# Patient Record
Sex: FEMALE | Race: WHITE | NOT HISPANIC OR LATINO | Employment: FULL TIME | URBAN - METROPOLITAN AREA
[De-identification: names, ages, dates, MRNs, and addresses within clinical notes are randomized per-mention and may not be internally consistent; named-entity substitution may affect disease eponyms.]

---

## 2017-01-18 ENCOUNTER — APPOINTMENT (OUTPATIENT)
Dept: WOMENS IMAGING | Facility: HOSPITAL | Age: 47
End: 2017-01-18

## 2017-01-18 PROCEDURE — 77067 SCR MAMMO BI INCL CAD: CPT | Performed by: RADIOLOGY

## 2017-02-17 ENCOUNTER — OFFICE VISIT (OUTPATIENT)
Dept: ONCOLOGY | Facility: CLINIC | Age: 47
End: 2017-02-17

## 2017-02-17 ENCOUNTER — LAB (OUTPATIENT)
Dept: LAB | Facility: HOSPITAL | Age: 47
End: 2017-02-17

## 2017-02-17 VITALS
HEART RATE: 66 BPM | OXYGEN SATURATION: 100 % | TEMPERATURE: 98.1 F | HEIGHT: 66 IN | RESPIRATION RATE: 16 BRPM | DIASTOLIC BLOOD PRESSURE: 70 MMHG | SYSTOLIC BLOOD PRESSURE: 118 MMHG | WEIGHT: 162.6 LBS | BODY MASS INDEX: 26.13 KG/M2

## 2017-02-17 DIAGNOSIS — D47.3 ESSENTIAL THROMBOCYTHEMIA (HCC): ICD-10-CM

## 2017-02-17 DIAGNOSIS — D47.3 ESSENTIAL THROMBOCYTHEMIA (HCC): Primary | ICD-10-CM

## 2017-02-17 LAB
BASOPHILS # BLD AUTO: 0.07 10*3/MM3 (ref 0–0.1)
BASOPHILS NFR BLD AUTO: 0.8 % (ref 0–1.1)
DEPRECATED RDW RBC AUTO: 35.5 FL (ref 37–49)
EOSINOPHIL # BLD AUTO: 0.48 10*3/MM3 (ref 0–0.36)
EOSINOPHIL NFR BLD AUTO: 5.4 % (ref 1–5)
ERYTHROCYTE [DISTWIDTH] IN BLOOD BY AUTOMATED COUNT: 17.1 % (ref 11.7–14.5)
HCT VFR BLD AUTO: 32.6 % (ref 34–45)
HGB BLD-MCNC: 10.5 G/DL (ref 11.5–14.9)
IMM GRANULOCYTES # BLD: 0.04 10*3/MM3 (ref 0–0.03)
IMM GRANULOCYTES NFR BLD: 0.5 % (ref 0–0.5)
LYMPHOCYTES # BLD AUTO: 1.92 10*3/MM3 (ref 1–3.5)
LYMPHOCYTES NFR BLD AUTO: 21.7 % (ref 20–49)
MCH RBC QN AUTO: 20.2 PG (ref 27–33)
MCHC RBC AUTO-ENTMCNC: 32.2 G/DL (ref 32–35)
MCV RBC AUTO: 62.7 FL (ref 83–97)
MONOCYTES # BLD AUTO: 0.59 10*3/MM3 (ref 0.25–0.8)
MONOCYTES NFR BLD AUTO: 6.7 % (ref 4–12)
NEUTROPHILS # BLD AUTO: 5.75 10*3/MM3 (ref 1.5–7)
NEUTROPHILS NFR BLD AUTO: 64.9 % (ref 39–75)
NRBC BLD MANUAL-RTO: 0 /100 WBC (ref 0–0)
PLATELET # BLD AUTO: 1262 10*3/MM3 (ref 150–375)
PMV BLD AUTO: 9.9 FL (ref 8.9–12.1)
RBC # BLD AUTO: 5.2 10*6/MM3 (ref 3.9–5)
WBC NRBC COR # BLD: 8.85 10*3/MM3 (ref 4–10)

## 2017-02-17 PROCEDURE — 99213 OFFICE O/P EST LOW 20 MIN: CPT | Performed by: INTERNAL MEDICINE

## 2017-02-17 PROCEDURE — 85025 COMPLETE CBC W/AUTO DIFF WBC: CPT | Performed by: INTERNAL MEDICINE

## 2017-02-17 PROCEDURE — 36416 COLLJ CAPILLARY BLOOD SPEC: CPT | Performed by: INTERNAL MEDICINE

## 2017-02-17 RX ORDER — FLUNISOLIDE 0.25 MG/ML
SOLUTION NASAL
COMMUNITY
Start: 2017-02-06 | End: 2017-06-23

## 2017-02-17 RX ORDER — AZELASTINE 1 MG/ML
SPRAY, METERED NASAL
COMMUNITY
Start: 2017-02-06 | End: 2017-06-23

## 2017-02-17 NOTE — PROGRESS NOTES
"[facility] GROUP OUTPATIENT FOLLOW UP CLINIC VISIT    REASON FOR FOLLOW-UP:    1.  Beta thalassemia minor.  2.  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    HISTORY OF PRESENT ILLNESS:  Mar Hill is a 46 y.o. female who returns today for follow up of the above issue.  She is doing well and denies any new problems today.  No bleeding.  No headaches.  No visual changes.  She continues aspirin 81 mg daily.    PAST MEDICAL, SURGICAL, FAMILY, AND SOCIAL HISTORIES were reviewed with the patient and in the electronic medical record, and were updated if indicated.    ALLERGIES:  No Known Allergies    MEDICATIONS:  The medication list has been reviewed with the patient by the medical assistant, and the list has been updated in the electronic medical record, which I reviewed.  Medication dosages and frequencies were confirmed to be accurate.    REVIEW OF SYSTEMS:  PAIN:  See Vital Signs below.  GENERAL:  No fevers, chills, night sweats, or unintended weight loss.  SKIN:  No rashes or non-healing lesions.  HEME/LYMPH:  No abnormal bleeding.  No palpable lymphadenopathy.  EYES:  No vision changes or diplopia.  ENT:  See history of present illness  RESPIRATORY:  No cough, shortness of breath, hemoptysis, or wheezing.  CARDIOVASCULAR:  No chest pain, palpitations, orthopnea, or dyspnea on exertion.  GASTROINTESTINAL:  No abdominal pain, nausea, vomiting, constipation, diarrhea, melena, or hematochezia.  GENITOURINARY:  No dysuria or hematuria.  MUSCULOSKELETAL:  No muscle or joint pain.  NEUROLOGIC:  No dizziness, loss of consciousness, or seizures.  PSYCHIATRIC:  No depression, anxiety, or mood changes.    Vitals:    02/17/17 1241   BP: 118/70   Pulse: 66   Resp: 16   Temp: 98.1 °F (36.7 °C)   TempSrc: Oral   SpO2: 100%   Weight: 162 lb 9.6 oz (73.8 kg)   Height: 66.34\" (168.5 cm)   PainSc: 0-No pain       PHYSICAL EXAMINATION:  GENERAL:  Well-developed well-nourished female; awake, alert and " oriented, in no acute distress.  SKIN:  Warm and dry, without rashes, purpura, or petechiae.  HEAD:  Normocephalic, atraumatic.  EYES:  Pupils equal, round and reactive to light.  Extraocular movements intact.  Conjunctivae normal.  EARS:  Hearing intact.  NOSE:  Septum midline.  No excoriations or nasal discharge.  MOUTH:  No stomatitis or ulcers.  Lips are normal.  THROAT:  Oropharynx without lesions or exudates.  NECK:  Supple with good range of motion; no thyromegaly or masses; no JVD or bruits.  LYMPHATICS:  No cervical, supraclavicular, axillary, or inguinal lymphadenopathy.  CHEST:  Lungs are clear to auscultation bilaterally.  No wheezes, rales, or rhonchi.  HEART:  Regular rate; normal rhythm.  No murmurs, gallops or rubs.  ABDOMEN:  Soft, non-tender, non-distended.  Normal active bowel sounds.  No organomegaly.  EXTREMITIES:  No clubbing, cyanosis, or edema.  NEUROLOGICAL:  No focal neurologic deficits.    DIAGNOSTIC DATA:  Lab Results   Component Value Date    WBC 8.85 02/17/2017    HGB 10.5 (L) 02/17/2017    HCT 32.6 (L) 02/17/2017    MCV 62.7 (L) 02/17/2017    PLT 1262 (C) 02/17/2017     Genetic testing was positive for a calreticulin mutation.  EMANUEL 2 and MPL mutation analyses were negative.  Von Willebrand's antigen and activity levels were normal.    ASSESSMENT:  This is a 46 y.o. female with:  1.  Essential thrombocythemia with a calreticulin mutation.  Her platelet count is rising a little bit, but I see no reason for treatment at this time as she is at low risk for thromboembolic complications. She will continue aspirin 81 mg daily.    2.  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    PLAN:  1.  Continue aspirin 81 mg daily.  2.  Return in 4 months for follow-up.

## 2017-06-23 ENCOUNTER — LAB (OUTPATIENT)
Dept: LAB | Facility: HOSPITAL | Age: 47
End: 2017-06-23

## 2017-06-23 ENCOUNTER — OFFICE VISIT (OUTPATIENT)
Dept: ONCOLOGY | Facility: CLINIC | Age: 47
End: 2017-06-23

## 2017-06-23 VITALS
RESPIRATION RATE: 14 BRPM | TEMPERATURE: 98.2 F | DIASTOLIC BLOOD PRESSURE: 80 MMHG | WEIGHT: 164.8 LBS | OXYGEN SATURATION: 100 % | HEART RATE: 64 BPM | BODY MASS INDEX: 26.48 KG/M2 | SYSTOLIC BLOOD PRESSURE: 110 MMHG | HEIGHT: 66 IN

## 2017-06-23 DIAGNOSIS — D56.3 BETA THALASSEMIA MINOR: ICD-10-CM

## 2017-06-23 DIAGNOSIS — D47.3 ESSENTIAL THROMBOCYTHEMIA (HCC): ICD-10-CM

## 2017-06-23 DIAGNOSIS — D47.3 ESSENTIAL THROMBOCYTHEMIA (HCC): Primary | ICD-10-CM

## 2017-06-23 LAB
BASOPHILS # BLD AUTO: 0.07 10*3/MM3 (ref 0–0.1)
BASOPHILS NFR BLD AUTO: 0.6 % (ref 0–1.1)
DEPRECATED RDW RBC AUTO: 36 FL (ref 37–49)
EOSINOPHIL # BLD AUTO: 0.41 10*3/MM3 (ref 0–0.36)
EOSINOPHIL NFR BLD AUTO: 3.4 % (ref 1–5)
ERYTHROCYTE [DISTWIDTH] IN BLOOD BY AUTOMATED COUNT: 17.1 % (ref 11.7–14.5)
HCT VFR BLD AUTO: 33.9 % (ref 34–45)
HGB BLD-MCNC: 10.7 G/DL (ref 11.5–14.9)
IMM GRANULOCYTES # BLD: 0.09 10*3/MM3 (ref 0–0.03)
IMM GRANULOCYTES NFR BLD: 0.7 % (ref 0–0.5)
LYMPHOCYTES # BLD AUTO: 2.09 10*3/MM3 (ref 1–3.5)
LYMPHOCYTES NFR BLD AUTO: 17.3 % (ref 20–49)
MCH RBC QN AUTO: 20.2 PG (ref 27–33)
MCHC RBC AUTO-ENTMCNC: 31.6 G/DL (ref 32–35)
MCV RBC AUTO: 63.8 FL (ref 83–97)
MONOCYTES # BLD AUTO: 0.81 10*3/MM3 (ref 0.25–0.8)
MONOCYTES NFR BLD AUTO: 6.7 % (ref 4–12)
NEUTROPHILS # BLD AUTO: 8.62 10*3/MM3 (ref 1.5–7)
NEUTROPHILS NFR BLD AUTO: 71.3 % (ref 39–75)
NRBC BLD MANUAL-RTO: 0 /100 WBC (ref 0–0)
PLATELET # BLD AUTO: 1168 10*3/MM3 (ref 150–375)
PMV BLD AUTO: 9.8 FL (ref 8.9–12.1)
RBC # BLD AUTO: 5.31 10*6/MM3 (ref 3.9–5)
WBC NRBC COR # BLD: 12.09 10*3/MM3 (ref 4–10)

## 2017-06-23 PROCEDURE — 36416 COLLJ CAPILLARY BLOOD SPEC: CPT | Performed by: INTERNAL MEDICINE

## 2017-06-23 PROCEDURE — 85025 COMPLETE CBC W/AUTO DIFF WBC: CPT | Performed by: INTERNAL MEDICINE

## 2017-06-23 PROCEDURE — 99213 OFFICE O/P EST LOW 20 MIN: CPT | Performed by: INTERNAL MEDICINE

## 2017-06-23 NOTE — PROGRESS NOTES
[facility] GROUP OUTPATIENT FOLLOW UP CLINIC VISIT    REASON FOR FOLLOW-UP:    1.  Beta thalassemia minor.  2.  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    HISTORY OF PRESENT ILLNESS:  Mar Hill is a 47 y.o. female who returns today for follow up of the above issue.  She continues aspirin 81 mg daily.  She tolerates this well.  She continues to have some mild fatigue.  Otherwise she is doing very well.    Of note, her father was recently diagnosed with adenocarcinoma of unknown primary with a tumor mass near the right axilla or right flank.  This was resected and he is going to have chemotherapy and radiation apparently.    PAST MEDICAL, SURGICAL, FAMILY, AND SOCIAL HISTORIES were reviewed with the patient and in the electronic medical record, and were updated if indicated.    ALLERGIES:  No Known Allergies    MEDICATIONS:  The medication list has been reviewed with the patient by the medical assistant, and the list has been updated in the electronic medical record, which I reviewed.  Medication dosages and frequencies were confirmed to be accurate.    REVIEW OF SYSTEMS:  PAIN:  See Vital Signs below.  GENERAL:  No fevers, chills, night sweats, or unintended weight loss.  SKIN:  No rashes or non-healing lesions.  HEME/LYMPH:  No abnormal bleeding.  No palpable lymphadenopathy.  EYES:  No vision changes or diplopia.  ENT:  See history of present illness  RESPIRATORY:  No cough, shortness of breath, hemoptysis, or wheezing.  CARDIOVASCULAR:  No chest pain, palpitations, orthopnea, or dyspnea on exertion.  GASTROINTESTINAL:  No abdominal pain, nausea, vomiting, constipation, diarrhea, melena, or hematochezia.  GENITOURINARY:  No dysuria or hematuria.  MUSCULOSKELETAL:  No muscle or joint pain.  NEUROLOGIC:  No dizziness, loss of consciousness, or seizures.  PSYCHIATRIC:  No depression, anxiety, or mood changes.    Vitals:    06/23/17 1309   BP: 110/80   Pulse: 64   Resp: 14   Temp: 98.2  "°F (36.8 °C)   SpO2: 100%   Weight: 164 lb 12.8 oz (74.8 kg)   Height: 66.34\" (168.5 cm)   PainSc: 0-No pain       PHYSICAL EXAMINATION:  GENERAL:  Well-developed well-nourished female; awake, alert and oriented, in no acute distress.  SKIN:  Warm and dry, without rashes, purpura, or petechiae.  HEAD:  Normocephalic, atraumatic.  EYES:  Pupils equal, round and reactive to light.  Extraocular movements intact.  Conjunctivae normal.  EARS:  Hearing intact.  NOSE:  Septum midline.  No excoriations or nasal discharge.  MOUTH:  No stomatitis or ulcers.  Lips are normal.  THROAT:  Oropharynx without lesions or exudates.  NECK:  Supple with good range of motion; no thyromegaly or masses; no JVD or bruits.  LYMPHATICS:  No cervical, supraclavicular, axillary, or inguinal lymphadenopathy.  CHEST:  Lungs are clear to auscultation bilaterally.  No wheezes, rales, or rhonchi.  HEART:  Regular rate; normal rhythm.  No murmurs, gallops or rubs.  ABDOMEN:  Soft, non-tender, non-distended.  Normal active bowel sounds.  No organomegaly.  EXTREMITIES:  No clubbing, cyanosis, or edema.  NEUROLOGICAL:  No focal neurologic deficits.    DIAGNOSTIC DATA:  Lab Results   Component Value Date    WBC 12.09 (H) 06/23/2017    HGB 10.7 (L) 06/23/2017    HCT 33.9 (L) 06/23/2017    MCV 63.8 (L) 06/23/2017    PLT 1168 (C) 06/23/2017     Genetic testing was positive for a calreticulin mutation.  EMANUEL 2 and MPL mutation analyses were negative.    Von Willebrand's antigen and activity levels were normal.    ASSESSMENT:  This is a 47 y.o. female with:  1.  Essential thrombocythemia with a calreticulin mutation.  Her platelet count is quite elevated but stable at this point.  I believe that she is at low risk for thromboembolic complications.  She will continue aspirin 81 mg daily.  No other therapy at this time.    2.  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    PLAN:  1.  Continue aspirin 81 mg daily.  2.  Return in four months for " follow-up.

## 2017-10-02 ENCOUNTER — LAB (OUTPATIENT)
Dept: LAB | Facility: HOSPITAL | Age: 47
End: 2017-10-02

## 2017-10-02 ENCOUNTER — OFFICE VISIT (OUTPATIENT)
Dept: ONCOLOGY | Facility: CLINIC | Age: 47
End: 2017-10-02

## 2017-10-02 VITALS
HEART RATE: 71 BPM | WEIGHT: 165.4 LBS | HEIGHT: 66 IN | SYSTOLIC BLOOD PRESSURE: 128 MMHG | BODY MASS INDEX: 26.58 KG/M2 | RESPIRATION RATE: 16 BRPM | OXYGEN SATURATION: 99 % | DIASTOLIC BLOOD PRESSURE: 82 MMHG | TEMPERATURE: 98.1 F

## 2017-10-02 DIAGNOSIS — D47.3 ESSENTIAL THROMBOCYTHEMIA (HCC): ICD-10-CM

## 2017-10-02 DIAGNOSIS — D56.3 BETA THALASSEMIA MINOR: ICD-10-CM

## 2017-10-02 DIAGNOSIS — D47.3 ESSENTIAL THROMBOCYTHEMIA (HCC): Primary | ICD-10-CM

## 2017-10-02 LAB
BASOPHILS # BLD AUTO: 0.11 10*3/MM3 (ref 0–0.1)
BASOPHILS NFR BLD AUTO: 1.1 % (ref 0–1.1)
DEPRECATED RDW RBC AUTO: 33.8 FL (ref 37–49)
EOSINOPHIL # BLD AUTO: 0.26 10*3/MM3 (ref 0–0.36)
EOSINOPHIL NFR BLD AUTO: 2.6 % (ref 1–5)
ERYTHROCYTE [DISTWIDTH] IN BLOOD BY AUTOMATED COUNT: 16.1 % (ref 11.7–14.5)
HCT VFR BLD AUTO: 33.4 % (ref 34–45)
HGB BLD-MCNC: 10.8 G/DL (ref 11.5–14.9)
IMM GRANULOCYTES # BLD: 0.06 10*3/MM3 (ref 0–0.03)
IMM GRANULOCYTES NFR BLD: 0.6 % (ref 0–0.5)
LYMPHOCYTES # BLD AUTO: 2.02 10*3/MM3 (ref 1–3.5)
LYMPHOCYTES NFR BLD AUTO: 20.3 % (ref 20–49)
MCH RBC QN AUTO: 20.1 PG (ref 27–33)
MCHC RBC AUTO-ENTMCNC: 32.3 G/DL (ref 32–35)
MCV RBC AUTO: 62.2 FL (ref 83–97)
MONOCYTES # BLD AUTO: 0.59 10*3/MM3 (ref 0.25–0.8)
MONOCYTES NFR BLD AUTO: 5.9 % (ref 4–12)
NEUTROPHILS # BLD AUTO: 6.92 10*3/MM3 (ref 1.5–7)
NEUTROPHILS NFR BLD AUTO: 69.5 % (ref 39–75)
NRBC BLD MANUAL-RTO: 0 /100 WBC (ref 0–0)
PLATELET # BLD AUTO: 1322 10*3/MM3 (ref 150–375)
PMV BLD AUTO: 9.9 FL (ref 8.9–12.1)
RBC # BLD AUTO: 5.37 10*6/MM3 (ref 3.9–5)
WBC NRBC COR # BLD: 9.96 10*3/MM3 (ref 4–10)

## 2017-10-02 PROCEDURE — 85025 COMPLETE CBC W/AUTO DIFF WBC: CPT | Performed by: INTERNAL MEDICINE

## 2017-10-02 PROCEDURE — 99213 OFFICE O/P EST LOW 20 MIN: CPT | Performed by: INTERNAL MEDICINE

## 2017-10-02 PROCEDURE — 36415 COLL VENOUS BLD VENIPUNCTURE: CPT | Performed by: INTERNAL MEDICINE

## 2017-10-02 RX ORDER — RANITIDINE 150 MG/1
TABLET ORAL
COMMUNITY
Start: 2017-09-05 | End: 2020-07-07 | Stop reason: SDDI

## 2017-10-02 NOTE — PROGRESS NOTES
"[facility] GROUP OUTPATIENT FOLLOW UP CLINIC VISIT    REASON FOR FOLLOW-UP:    1.  Beta thalassemia minor.  2.  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    HISTORY OF PRESENT ILLNESS:  Mar Hlil is a 47 y.o. female who returns today for follow up of the above issue.  She continues aspirin 81 mg daily.  She is doing well.  No bleeding.      PAST MEDICAL, SURGICAL, FAMILY, AND SOCIAL HISTORIES were reviewed with the patient and in the electronic medical record, and were updated if indicated.    ALLERGIES:  No Known Allergies    MEDICATIONS:  The medication list has been reviewed with the patient by the medical assistant, and the list has been updated in the electronic medical record, which I reviewed.  Medication dosages and frequencies were confirmed to be accurate.    REVIEW OF SYSTEMS:  PAIN:  See Vital Signs below.  GENERAL:  No fevers, chills, night sweats, or unintended weight loss.  SKIN:  No rashes or non-healing lesions.  HEME/LYMPH:  No abnormal bleeding.  No palpable lymphadenopathy.  EYES:  No vision changes or diplopia.  ENT:  See history of present illness  RESPIRATORY:  No cough, shortness of breath, hemoptysis, or wheezing.  CARDIOVASCULAR:  No chest pain, palpitations, orthopnea, or dyspnea on exertion.  GASTROINTESTINAL:  No abdominal pain, nausea, vomiting, constipation, diarrhea, melena, or hematochezia.  GENITOURINARY:  No dysuria or hematuria.  MUSCULOSKELETAL:  No muscle or joint pain.  NEUROLOGIC:  No dizziness, loss of consciousness, or seizures.  PSYCHIATRIC:  No depression, anxiety, or mood changes.    Vitals:    10/02/17 1138   BP: 128/82   Pulse: 71   Resp: 16   Temp: 98.1 °F (36.7 °C)   TempSrc: Oral   SpO2: 99%   Weight: 165 lb 6.4 oz (75 kg)   Height: 66.02\" (167.7 cm)  Comment: new ht w/shoes   PainSc: 0-No pain       PHYSICAL EXAMINATION:  GENERAL:  Well-developed well-nourished female; awake, alert and oriented, in no acute distress.  SKIN:  Warm and " dry, without rashes, purpura, or petechiae.  HEAD:  Normocephalic, atraumatic.  EYES:  Pupils equal, round and reactive to light.  Extraocular movements intact.  Conjunctivae normal.  EARS:  Hearing intact.  NOSE:  Septum midline.  No excoriations or nasal discharge.  MOUTH:  No stomatitis or ulcers.  Lips are normal.  THROAT:  Oropharynx without lesions or exudates.  NECK:  Supple with good range of motion; no thyromegaly or masses; no JVD or bruits.  LYMPHATICS:  No cervical, supraclavicular, axillary, or inguinal lymphadenopathy.  CHEST:  Lungs are clear to auscultation bilaterally.  No wheezes, rales, or rhonchi.  HEART:  Regular rate; normal rhythm.  No murmurs, gallops or rubs.  ABDOMEN:  Soft, non-tender, non-distended.  Normal active bowel sounds.  I am unable to palpate the spleen today.    EXTREMITIES:  No clubbing, cyanosis, or edema.  NEUROLOGICAL:  No focal neurologic deficits.    DIAGNOSTIC DATA:  Lab Results   Component Value Date    WBC 9.96 10/02/2017    HGB 10.8 (L) 10/02/2017    HCT 33.4 (L) 10/02/2017    MCV 62.2 (L) 10/02/2017    PLT 1322 (C) 10/02/2017     Genetic testing was positive for a calreticulin mutation.  EMANUEL 2 and MPL mutation analyses were negative.    Von Willebrand's antigen and activity levels were normal on 5/6/2016.    ASSESSMENT:  This is a 47 y.o. female with:  1.  Essential thrombocythemia with a calreticulin mutation.  Her platelet count is elevating a bit more, but staying below 1.5 million.  I believe that she is at low risk for thromboembolic complications.  She will continue aspirin 81 mg daily.  No other therapy at this time.    2.  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    PLAN:  1.  Continue aspirin 81 mg daily.  2.  Return in 3-4 months for follow-up.  3.  If her platelet count reaches 1.5 million we will repeat von Willebrand's studies

## 2017-10-08 ENCOUNTER — DOCUMENTATION (OUTPATIENT)
Dept: ONCOLOGY | Facility: CLINIC | Age: 47
End: 2017-10-08

## 2017-10-13 ENCOUNTER — TELEPHONE (OUTPATIENT)
Dept: ONCOLOGY | Facility: HOSPITAL | Age: 47
End: 2017-10-13

## 2017-10-13 NOTE — TELEPHONE ENCOUNTER
Patient states she called on call MD Sunday about nose bleed down back of throat for a few hours. See  note from this. Pt calling today bc she realized  told her to reduce to a baby asa but that is the dose she has been on this whole time. Didn't know if she should resume on it daily or back off to every other day. [facility] sent to  for his recommendation.

## 2017-10-24 ENCOUNTER — TELEPHONE (OUTPATIENT)
Dept: ONCOLOGY | Facility: HOSPITAL | Age: 47
End: 2017-10-24

## 2017-10-24 NOTE — TELEPHONE ENCOUNTER
----- Message from Bekah Forrest RN sent at 10/24/2017  1:53 PM EDT -----      ----- Message -----     From: Segundo Robledo MD     Sent: 10/24/2017  12:07 PM       To: Bekah Forrest RN    Daily please.    ----- Message -----     From: Bekah Forrest RN     Sent: 10/13/2017   1:51 PM       To: Segundo Robledo MD    Patient had nose bleed down her throat on Sunday for a few hours. Called on call MD, was told to hold aspirin and resume with baby aspirin in 5 days. However she has been on baby ASA this whole time. Wasn't sure if she should go back on it daily or cut it back to every other day--wanted your input.

## 2017-10-25 ENCOUNTER — APPOINTMENT (OUTPATIENT)
Dept: WOMENS IMAGING | Facility: HOSPITAL | Age: 47
End: 2017-10-25

## 2018-01-08 ENCOUNTER — APPOINTMENT (OUTPATIENT)
Dept: ONCOLOGY | Facility: CLINIC | Age: 48
End: 2018-01-08

## 2018-01-08 ENCOUNTER — APPOINTMENT (OUTPATIENT)
Dept: LAB | Facility: HOSPITAL | Age: 48
End: 2018-01-08

## 2018-01-23 ENCOUNTER — APPOINTMENT (OUTPATIENT)
Dept: WOMENS IMAGING | Facility: HOSPITAL | Age: 48
End: 2018-01-23

## 2018-01-23 PROCEDURE — 77067 SCR MAMMO BI INCL CAD: CPT | Performed by: RADIOLOGY

## 2018-01-23 PROCEDURE — 77063 BREAST TOMOSYNTHESIS BI: CPT | Performed by: RADIOLOGY

## 2018-01-29 ENCOUNTER — LAB (OUTPATIENT)
Dept: LAB | Facility: HOSPITAL | Age: 48
End: 2018-01-29

## 2018-01-29 ENCOUNTER — OFFICE VISIT (OUTPATIENT)
Dept: ONCOLOGY | Facility: CLINIC | Age: 48
End: 2018-01-29

## 2018-01-29 VITALS
WEIGHT: 156 LBS | OXYGEN SATURATION: 99 % | SYSTOLIC BLOOD PRESSURE: 122 MMHG | DIASTOLIC BLOOD PRESSURE: 74 MMHG | TEMPERATURE: 98.3 F | RESPIRATION RATE: 12 BRPM | BODY MASS INDEX: 25.99 KG/M2 | HEIGHT: 65 IN | HEART RATE: 68 BPM

## 2018-01-29 DIAGNOSIS — D47.3 ESSENTIAL THROMBOCYTHEMIA (HCC): ICD-10-CM

## 2018-01-29 DIAGNOSIS — D47.3 ESSENTIAL THROMBOCYTHEMIA (HCC): Primary | ICD-10-CM

## 2018-01-29 DIAGNOSIS — D56.3 BETA THALASSEMIA MINOR: ICD-10-CM

## 2018-01-29 LAB
BASOPHILS # BLD AUTO: 0.08 10*3/MM3 (ref 0–0.1)
BASOPHILS NFR BLD AUTO: 0.8 % (ref 0–1.1)
DEPRECATED RDW RBC AUTO: 34 FL (ref 37–49)
EOSINOPHIL # BLD AUTO: 0.24 10*3/MM3 (ref 0–0.36)
EOSINOPHIL NFR BLD AUTO: 2.3 % (ref 1–5)
ERYTHROCYTE [DISTWIDTH] IN BLOOD BY AUTOMATED COUNT: 15.9 % (ref 11.7–14.5)
HCT VFR BLD AUTO: 33.2 % (ref 34–45)
HGB BLD-MCNC: 10.5 G/DL (ref 11.5–14.9)
IMM GRANULOCYTES # BLD: 0.08 10*3/MM3 (ref 0–0.03)
IMM GRANULOCYTES NFR BLD: 0.8 % (ref 0–0.5)
LYMPHOCYTES # BLD AUTO: 1.99 10*3/MM3 (ref 1–3.5)
LYMPHOCYTES NFR BLD AUTO: 19 % (ref 20–49)
MCH RBC QN AUTO: 19.8 PG (ref 27–33)
MCHC RBC AUTO-ENTMCNC: 31.6 G/DL (ref 32–35)
MCV RBC AUTO: 62.6 FL (ref 83–97)
MONOCYTES # BLD AUTO: 0.69 10*3/MM3 (ref 0.25–0.8)
MONOCYTES NFR BLD AUTO: 6.6 % (ref 4–12)
NEUTROPHILS # BLD AUTO: 7.38 10*3/MM3 (ref 1.5–7)
NEUTROPHILS NFR BLD AUTO: 70.5 % (ref 39–75)
NRBC BLD MANUAL-RTO: 0 /100 WBC (ref 0–0)
PLATELET # BLD AUTO: 1276 10*3/MM3 (ref 150–375)
PMV BLD AUTO: 10.1 FL (ref 8.9–12.1)
RBC # BLD AUTO: 5.3 10*6/MM3 (ref 3.9–5)
WBC NRBC COR # BLD: 10.46 10*3/MM3 (ref 4–10)

## 2018-01-29 PROCEDURE — 85025 COMPLETE CBC W/AUTO DIFF WBC: CPT | Performed by: INTERNAL MEDICINE

## 2018-01-29 PROCEDURE — 99213 OFFICE O/P EST LOW 20 MIN: CPT | Performed by: INTERNAL MEDICINE

## 2018-01-29 PROCEDURE — 36415 COLL VENOUS BLD VENIPUNCTURE: CPT | Performed by: INTERNAL MEDICINE

## 2018-01-29 RX ORDER — ALBUTEROL SULFATE 90 UG/1
AEROSOL, METERED RESPIRATORY (INHALATION) AS NEEDED
COMMUNITY
Start: 2018-01-10 | End: 2020-11-11 | Stop reason: SDDI

## 2018-01-29 RX ORDER — OSELTAMIVIR PHOSPHATE 75 MG/1
CAPSULE ORAL
Refills: 0 | COMMUNITY
Start: 2018-01-08 | End: 2018-01-29

## 2018-01-29 RX ORDER — FLUCONAZOLE 150 MG/1
TABLET ORAL
COMMUNITY
Start: 2017-12-15 | End: 2018-01-29

## 2018-01-29 NOTE — PROGRESS NOTES
[facility] GROUP OUTPATIENT FOLLOW UP CLINIC VISIT    REASON FOR FOLLOW-UP:    1.  Beta thalassemia minor.  2.  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    HISTORY OF PRESENT ILLNESS:  Mar Hill is a 47 y.o. female who returns today for follow up of the above issue.  She continues aspirin 81 mg daily.  She is doing well.  She did call with significant epistaxis since she was last seen in the office.  This was draining down the back of her throat.  It resolved quickly and she has not had any further issues with bleeding.    PAST MEDICAL, SURGICAL, FAMILY, AND SOCIAL HISTORIES were reviewed with the patient and in the electronic medical record, and were updated if indicated.    ALLERGIES:  No Known Allergies    MEDICATIONS:  The medication list has been reviewed with the patient by the medical assistant, and the list has been updated in the electronic medical record, which I reviewed.  Medication dosages and frequencies were confirmed to be accurate.    REVIEW OF SYSTEMS:  PAIN:  See Vital Signs below.  GENERAL:  No fevers, chills, night sweats, or unintended weight loss.  SKIN:  No rashes or non-healing lesions.  HEME/LYMPH:  No abnormal bleeding.  No palpable lymphadenopathy.  EYES:  No vision changes or diplopia.  ENT:  See history of present illness  RESPIRATORY:  No cough, shortness of breath, hemoptysis, or wheezing.  CARDIOVASCULAR:  No chest pain, palpitations, orthopnea, or dyspnea on exertion.  GASTROINTESTINAL:  No abdominal pain, nausea, vomiting, constipation, diarrhea, melena, or hematochezia.  GENITOURINARY:  No dysuria or hematuria.  MUSCULOSKELETAL:  No muscle or joint pain.  NEUROLOGIC:  No dizziness, loss of consciousness, or seizures.  PSYCHIATRIC:  No depression, anxiety, or mood changes.    Vitals:    01/29/18 1256   BP: 122/74   Pulse: 68   Resp: 12   Temp: 98.3 °F (36.8 °C)   TempSrc: Oral   SpO2: 99%   Weight: 70.8 kg (156 lb)   Height: 165.6 cm  "(65.2\")  Comment: new height   PainSc: 0-No pain       PHYSICAL EXAMINATION:  GENERAL:  Well-developed well-nourished female; awake, alert and oriented, in no acute distress.  SKIN:  Warm and dry, without rashes, purpura, or petechiae.  HEAD:  Normocephalic, atraumatic.  EYES:  Pupils equal, round and reactive to light.  Extraocular movements intact.  Conjunctivae normal.  EARS:  Hearing intact.  NOSE:  Septum midline.  No excoriations or nasal discharge.  MOUTH:  No stomatitis or ulcers.  Lips are normal.  THROAT:  Oropharynx without lesions or exudates.  NECK:  Supple with good range of motion; no thyromegaly or masses; no JVD or bruits.  LYMPHATICS:  No cervical, supraclavicular, axillary, or inguinal lymphadenopathy.  CHEST:  Lungs are clear to auscultation bilaterally.  No wheezes, rales, or rhonchi.  HEART:  Regular rate; normal rhythm.  No murmurs, gallops or rubs.  ABDOMEN:  Soft, non-tender, non-distended.  Normal active bowel sounds.  I am unable to palpate the spleen today.    EXTREMITIES:  No clubbing, cyanosis, or edema.  NEUROLOGICAL:  No focal neurologic deficits.    DIAGNOSTIC DATA:  Lab Results   Component Value Date    WBC 10.46 (H) 01/29/2018    HGB 10.5 (L) 01/29/2018    HCT 33.2 (L) 01/29/2018    MCV 62.6 (L) 01/29/2018    PLT 1276 (C) 01/29/2018     Genetic testing was positive for a calreticulin mutation.  EMANUEL 2 and MPL mutation analyses were negative.    Von Willebrand's antigen and activity levels were normal on 5/6/2016.    ASSESSMENT:  This is a 47 y.o. female with:  1.  Essential thrombocythemia with a calreticulin mutation.  Her platelet count is stable and actually decreased from her last visit, but staying below 1.5 million.  I believe that she is at low risk for thromboembolic complications.  She will continue aspirin 81 mg daily.  No other therapy at this time.    2.  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    3.  Epistaxis which resolved without intervention other than " very briefly interrupting the aspirin therapy    PLAN:  1.  Continue aspirin 81 mg daily.  2.  Return in 3-4 months for follow-up with a CBC.  3.  If her platelet count reaches 1.5 million we will repeat von Willebrand's studies

## 2018-05-07 ENCOUNTER — OFFICE VISIT (OUTPATIENT)
Dept: ONCOLOGY | Facility: CLINIC | Age: 48
End: 2018-05-07

## 2018-05-07 ENCOUNTER — LAB (OUTPATIENT)
Dept: LAB | Facility: HOSPITAL | Age: 48
End: 2018-05-07

## 2018-05-07 VITALS
WEIGHT: 162.2 LBS | BODY MASS INDEX: 27.02 KG/M2 | HEIGHT: 65 IN | TEMPERATURE: 98.8 F | SYSTOLIC BLOOD PRESSURE: 100 MMHG | OXYGEN SATURATION: 100 % | HEART RATE: 71 BPM | DIASTOLIC BLOOD PRESSURE: 70 MMHG

## 2018-05-07 DIAGNOSIS — D47.3 ESSENTIAL THROMBOCYTHEMIA (HCC): ICD-10-CM

## 2018-05-07 DIAGNOSIS — D47.3 ESSENTIAL THROMBOCYTHEMIA (HCC): Primary | ICD-10-CM

## 2018-05-07 LAB
BASOPHILS # BLD AUTO: 0.08 10*3/MM3 (ref 0–0.1)
BASOPHILS NFR BLD AUTO: 0.8 % (ref 0–1.1)
DEPRECATED RDW RBC AUTO: 34.7 FL (ref 37–49)
EOSINOPHIL # BLD AUTO: 0.25 10*3/MM3 (ref 0–0.36)
EOSINOPHIL NFR BLD AUTO: 2.4 % (ref 1–5)
ERYTHROCYTE [DISTWIDTH] IN BLOOD BY AUTOMATED COUNT: 16.4 % (ref 11.7–14.5)
HCT VFR BLD AUTO: 33.8 % (ref 34–45)
HGB BLD-MCNC: 10.5 G/DL (ref 11.5–14.9)
IMM GRANULOCYTES # BLD: 0.06 10*3/MM3 (ref 0–0.03)
IMM GRANULOCYTES NFR BLD: 0.6 % (ref 0–0.5)
LYMPHOCYTES # BLD AUTO: 1.99 10*3/MM3 (ref 1–3.5)
LYMPHOCYTES NFR BLD AUTO: 18.8 % (ref 20–49)
MCH RBC QN AUTO: 19.6 PG (ref 27–33)
MCHC RBC AUTO-ENTMCNC: 31.1 G/DL (ref 32–35)
MCV RBC AUTO: 63.2 FL (ref 83–97)
MONOCYTES # BLD AUTO: 0.57 10*3/MM3 (ref 0.25–0.8)
MONOCYTES NFR BLD AUTO: 5.4 % (ref 4–12)
NEUTROPHILS # BLD AUTO: 7.61 10*3/MM3 (ref 1.5–7)
NEUTROPHILS NFR BLD AUTO: 72 % (ref 39–75)
NRBC BLD MANUAL-RTO: 0 /100 WBC (ref 0–0)
PLATELET # BLD AUTO: 1117 10*3/MM3 (ref 150–375)
PMV BLD AUTO: 10 FL (ref 8.9–12.1)
RBC # BLD AUTO: 5.35 10*6/MM3 (ref 3.9–5)
WBC NRBC COR # BLD: 10.56 10*3/MM3 (ref 4–10)

## 2018-05-07 PROCEDURE — 36415 COLL VENOUS BLD VENIPUNCTURE: CPT | Performed by: INTERNAL MEDICINE

## 2018-05-07 PROCEDURE — 85025 COMPLETE CBC W/AUTO DIFF WBC: CPT

## 2018-05-07 PROCEDURE — 99213 OFFICE O/P EST LOW 20 MIN: CPT | Performed by: INTERNAL MEDICINE

## 2018-05-07 NOTE — PROGRESS NOTES
"[facility] GROUP OUTPATIENT FOLLOW UP CLINIC VISIT    REASON FOR FOLLOW-UP:    1.  Beta thalassemia minor.  2.  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    HISTORY OF PRESENT ILLNESS:  Mar Hill is a 48 y.o. female who returns today for follow up of the above issue.  She continues aspirin 81 mg daily.  She continues to do well without any new issues today.  No bleeding.      PAST MEDICAL, SURGICAL, FAMILY, AND SOCIAL HISTORIES were reviewed with the patient and in the electronic medical record, and were updated if indicated.    ALLERGIES:  No Known Allergies    MEDICATIONS:  The medication list has been reviewed with the patient by the medical assistant, and the list has been updated in the electronic medical record, which I reviewed.  Medication dosages and frequencies were confirmed to be accurate.    REVIEW OF SYSTEMS:  PAIN:  See Vital Signs below.  GENERAL:  No fevers, chills, night sweats, or unintended weight loss.  SKIN:  No rashes or non-healing lesions.  HEME/LYMPH:  No abnormal bleeding.  No palpable lymphadenopathy.  EYES:  No vision changes or diplopia.  ENT:  No sore throat or difficulty swallowing  RESPIRATORY:  No cough, shortness of breath, hemoptysis, or wheezing.  CARDIOVASCULAR:  No chest pain, palpitations, orthopnea, or dyspnea on exertion.  GASTROINTESTINAL:  No abdominal pain, nausea, vomiting, constipation, diarrhea, melena, or hematochezia.  GENITOURINARY:  No dysuria or hematuria.  MUSCULOSKELETAL:  No muscle or joint pain.  NEUROLOGIC:  No dizziness, loss of consciousness, or seizures.  PSYCHIATRIC:  No depression, anxiety, or mood changes.    Vitals:    05/07/18 1315   BP: 100/70   Pulse: 71   Temp: 98.8 °F (37.1 °C)   SpO2: 100%  Comment: at rest   Weight: 73.6 kg (162 lb 3.2 oz)   Height: 166 cm (65.35\")  Comment: new ht w/o shoes   PainSc: 0-No pain       PHYSICAL EXAMINATION:  GENERAL:  Well-developed well-nourished female; awake, alert and oriented, " in no acute distress.  SKIN:  Warm and dry, without rashes, purpura, or petechiae.  HEAD:  Normocephalic, atraumatic.  EYES:  Pupils equal, round and reactive to light.  Extraocular movements intact.  Conjunctivae normal.  EARS:  Hearing intact.  NOSE:  Septum midline.  No excoriations or nasal discharge.  MOUTH:  No stomatitis or ulcers.  Lips are normal.  THROAT:  Oropharynx without lesions or exudates.  NECK:  Supple with good range of motion; no thyromegaly or masses; no JVD or bruits.  LYMPHATICS:  No cervical, supraclavicular, axillary, or inguinal lymphadenopathy.  CHEST:  Lungs are clear to auscultation bilaterally.  No wheezes, rales, or rhonchi.  HEART:  Regular rate; normal rhythm.  No murmurs, gallops or rubs.  ABDOMEN:  Not examined today  EXTREMITIES:  No clubbing, cyanosis, or edema.  NEUROLOGICAL:  No focal neurologic deficits.    DIAGNOSTIC DATA:  Results for orders placed or performed in visit on 05/07/18   CBC Auto Differential   Result Value Ref Range    WBC 10.56 (H) 4.00 - 10.00 10*3/mm3    RBC 5.35 (H) 3.90 - 5.00 10*6/mm3    Hemoglobin 10.5 (L) 11.5 - 14.9 g/dL    Hematocrit 33.8 (L) 34.0 - 45.0 %    MCV 63.2 (L) 83.0 - 97.0 fL    MCH 19.6 (L) 27.0 - 33.0 pg    MCHC 31.1 (L) 32.0 - 35.0 g/dL    RDW 16.4 (H) 11.7 - 14.5 %    RDW-SD 34.7 (L) 37.0 - 49.0 fl    MPV 10.0 8.9 - 12.1 fL    Platelets 1,117 (C) 150 - 375 10*3/mm3    Neutrophil % 72.0 39.0 - 75.0 %    Lymphocyte % 18.8 (L) 20.0 - 49.0 %    Monocyte % 5.4 4.0 - 12.0 %    Eosinophil % 2.4 1.0 - 5.0 %    Basophil % 0.8 0.0 - 1.1 %    Immature Grans % 0.6 (H) 0.0 - 0.5 %    Neutrophils, Absolute 7.61 (H) 1.50 - 7.00 10*3/mm3    Lymphocytes, Absolute 1.99 1.00 - 3.50 10*3/mm3    Monocytes, Absolute 0.57 0.25 - 0.80 10*3/mm3    Eosinophils, Absolute 0.25 0.00 - 0.36 10*3/mm3    Basophils, Absolute 0.08 0.00 - 0.10 10*3/mm3    Immature Grans, Absolute 0.06 (H) 0.00 - 0.03 10*3/mm3    nRBC 0.0 0.0 - 0.0 /100 WBC     Genetic testing was  positive for a calreticulin mutation.  EMANUEL 2 and MPL mutation analyses were negative.    Von Willebrand's antigen and activity levels were normal on 5/6/2016.    ASSESSMENT:  This is a 48 y.o. female with:  1.  Essential thrombocythemia with a calreticulin mutation.  Her platelet count is lower today.   I believe that she is at low risk for thromboembolic complications.  She will continue aspirin 81 mg daily.  No other therapy at this time.    2.  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  Epistaxis which resolved without intervention other than very briefly interrupting the aspirin therapy    PLAN:  1.  Continue aspirin 81 mg daily.  2.  Return in 4 months for follow-up with a CBC.  3.  If her platelet count reaches 1.5 million we will repeat von Willebrand's studies

## 2018-08-29 ENCOUNTER — LAB (OUTPATIENT)
Dept: LAB | Facility: HOSPITAL | Age: 48
End: 2018-08-29

## 2018-08-29 ENCOUNTER — OFFICE VISIT (OUTPATIENT)
Dept: ONCOLOGY | Facility: CLINIC | Age: 48
End: 2018-08-29

## 2018-08-29 VITALS
OXYGEN SATURATION: 97 % | DIASTOLIC BLOOD PRESSURE: 62 MMHG | TEMPERATURE: 98.3 F | SYSTOLIC BLOOD PRESSURE: 110 MMHG | BODY MASS INDEX: 26.33 KG/M2 | WEIGHT: 158 LBS | HEIGHT: 65 IN | RESPIRATION RATE: 18 BRPM | HEART RATE: 82 BPM

## 2018-08-29 DIAGNOSIS — D47.3 ESSENTIAL THROMBOCYTHEMIA (HCC): Primary | ICD-10-CM

## 2018-08-29 DIAGNOSIS — D47.3 ESSENTIAL THROMBOCYTHEMIA (HCC): ICD-10-CM

## 2018-08-29 LAB
BASOPHILS # BLD AUTO: 0.08 10*3/MM3 (ref 0–0.1)
BASOPHILS NFR BLD AUTO: 0.9 % (ref 0–1.1)
DEPRECATED RDW RBC AUTO: 35.7 FL (ref 37–49)
EOSINOPHIL # BLD AUTO: 0.3 10*3/MM3 (ref 0–0.36)
EOSINOPHIL NFR BLD AUTO: 3.2 % (ref 1–5)
ERYTHROCYTE [DISTWIDTH] IN BLOOD BY AUTOMATED COUNT: 16.6 % (ref 11.7–14.5)
HCT VFR BLD AUTO: 35.2 % (ref 34–45)
HGB BLD-MCNC: 10.9 G/DL (ref 11.5–14.9)
IMM GRANULOCYTES # BLD: 0.05 10*3/MM3 (ref 0–0.03)
IMM GRANULOCYTES NFR BLD: 0.5 % (ref 0–0.5)
LYMPHOCYTES # BLD AUTO: 1.97 10*3/MM3 (ref 1–3.5)
LYMPHOCYTES NFR BLD AUTO: 21.2 % (ref 20–49)
MCH RBC QN AUTO: 19.7 PG (ref 27–33)
MCHC RBC AUTO-ENTMCNC: 31 G/DL (ref 32–35)
MCV RBC AUTO: 63.7 FL (ref 83–97)
MONOCYTES # BLD AUTO: 0.6 10*3/MM3 (ref 0.25–0.8)
MONOCYTES NFR BLD AUTO: 6.5 % (ref 4–12)
NEUTROPHILS # BLD AUTO: 6.28 10*3/MM3 (ref 1.5–7)
NEUTROPHILS NFR BLD AUTO: 67.7 % (ref 39–75)
NRBC BLD MANUAL-RTO: 0 /100 WBC (ref 0–0)
PLATELET # BLD AUTO: 1277 10*3/MM3 (ref 150–375)
PMV BLD AUTO: 10.1 FL (ref 8.9–12.1)
RBC # BLD AUTO: 5.53 10*6/MM3 (ref 3.9–5)
WBC NRBC COR # BLD: 9.28 10*3/MM3 (ref 4–10)

## 2018-08-29 PROCEDURE — 99213 OFFICE O/P EST LOW 20 MIN: CPT | Performed by: INTERNAL MEDICINE

## 2018-08-29 PROCEDURE — 85025 COMPLETE CBC W/AUTO DIFF WBC: CPT | Performed by: INTERNAL MEDICINE

## 2018-08-29 PROCEDURE — 36415 COLL VENOUS BLD VENIPUNCTURE: CPT | Performed by: INTERNAL MEDICINE

## 2018-08-29 NOTE — PROGRESS NOTES
"[facility] GROUP OUTPATIENT FOLLOW UP CLINIC VISIT    REASON FOR FOLLOW-UP:    1.  Beta thalassemia minor.  2.  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    HISTORY OF PRESENT ILLNESS:  Mar Hill is a 48 y.o. female who returns today for follow up of the above issue.  She continues aspirin 81 mg daily.  She continues to do well without any new issues today.  No bleeding.  No change from prior visits.       PAST MEDICAL, SURGICAL, FAMILY, AND SOCIAL HISTORIES were reviewed with the patient and in the electronic medical record, and were updated if indicated.    ALLERGIES:  No Known Allergies    MEDICATIONS:  The medication list has been reviewed with the patient by the medical assistant, and the list has been updated in the electronic medical record, which I reviewed.  Medication dosages and frequencies were confirmed to be accurate.    REVIEW OF SYSTEMS:  PAIN:  See Vital Signs below.  GENERAL:  No fevers, chills, night sweats, or unintended weight loss.  SKIN:  No rashes or non-healing lesions.  HEME/LYMPH:  No abnormal bleeding.  No palpable lymphadenopathy.  EYES:  No vision changes or diplopia.  ENT:  No sore throat or difficulty swallowing  RESPIRATORY:  No cough, shortness of breath, hemoptysis, or wheezing.  CARDIOVASCULAR:  No chest pain, palpitations, orthopnea, or dyspnea on exertion.  GASTROINTESTINAL:  No abdominal pain, nausea, vomiting, constipation, diarrhea, melena, or hematochezia.  GENITOURINARY:  No dysuria or hematuria.  MUSCULOSKELETAL:  No muscle or joint pain.  NEUROLOGIC:  No dizziness, loss of consciousness, or seizures.  PSYCHIATRIC:  No depression, anxiety, or mood changes.  No change from prior    Vitals:    08/29/18 1209   BP: 110/62   Pulse: 82   Resp: 18   Temp: 98.3 °F (36.8 °C)   TempSrc: Oral   SpO2: 97%   Weight: 71.7 kg (158 lb)   Height: 166 cm (65.35\")  Comment: new height without shoes   PainSc: 0-No pain       PHYSICAL EXAMINATION:  GENERAL:  " Well-developed well-nourished female; awake, alert and oriented, in no acute distress.  SKIN:  Warm and dry, without rashes, purpura, or petechiae.  HEAD:  Normocephalic, atraumatic.  EYES:  Pupils equal, round and reactive to light.  Extraocular movements intact.  Conjunctivae normal.  EARS:  Hearing intact.  NOSE:  Septum midline.  No excoriations or nasal discharge.  MOUTH:  No stomatitis or ulcers.  Lips are normal.  THROAT:  Oropharynx without lesions or exudates.  NECK:  Supple with good range of motion; no thyromegaly or masses; no JVD or bruits.  LYMPHATICS:  No cervical, supraclavicular, axillary, or inguinal lymphadenopathy.  CHEST:  Lungs are clear to auscultation bilaterally.  No wheezes, rales, or rhonchi.  HEART:  Regular rate; normal rhythm.  No murmurs, gallops or rubs.  ABDOMEN:  Soft, NT, ND, NABS, spleen not palpable  EXTREMITIES:  No clubbing, cyanosis, or edema.  NEUROLOGICAL:  No focal neurologic deficits.    DIAGNOSTIC DATA:  Results for orders placed or performed in visit on 08/29/18   CBC Auto Differential   Result Value Ref Range    WBC 9.28 4.00 - 10.00 10*3/mm3    RBC 5.53 (H) 3.90 - 5.00 10*6/mm3    Hemoglobin 10.9 (L) 11.5 - 14.9 g/dL    Hematocrit 35.2 34.0 - 45.0 %    MCV 63.7 (L) 83.0 - 97.0 fL    MCH 19.7 (L) 27.0 - 33.0 pg    MCHC 31.0 (L) 32.0 - 35.0 g/dL    RDW 16.6 (H) 11.7 - 14.5 %    RDW-SD 35.7 (L) 37.0 - 49.0 fl    MPV 10.1 8.9 - 12.1 fL    Platelets 1,277 (C) 150 - 375 10*3/mm3    Neutrophil % 67.7 39.0 - 75.0 %    Lymphocyte % 21.2 20.0 - 49.0 %    Monocyte % 6.5 4.0 - 12.0 %    Eosinophil % 3.2 1.0 - 5.0 %    Basophil % 0.9 0.0 - 1.1 %    Immature Grans % 0.5 0.0 - 0.5 %    Neutrophils, Absolute 6.28 1.50 - 7.00 10*3/mm3    Lymphocytes, Absolute 1.97 1.00 - 3.50 10*3/mm3    Monocytes, Absolute 0.60 0.25 - 0.80 10*3/mm3    Eosinophils, Absolute 0.30 0.00 - 0.36 10*3/mm3    Basophils, Absolute 0.08 0.00 - 0.10 10*3/mm3    Immature Grans, Absolute 0.05 (H) 0.00 - 0.03  10*3/mm3    nRBC 0.0 0.0 - 0.0 /100 WBC     Genetic testing was positive for a calreticulin mutation.  EMANUEL 2 and MPL mutation analyses were negative.    Von Willebrand's antigen and activity levels were normal on 5/6/2016.    ASSESSMENT:  This is a 48 y.o. female with:  1.  Essential thrombocythemia with a calreticulin mutation.  Her platelet count is stable today.   I believe that she is at low risk for thromboembolic complications.  She will continue aspirin 81 mg daily.  No other therapy at this time.    2.  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  Epistaxis which resolved without intervention other than very briefly interrupting the aspirin therapy    PLAN:  1.  Continue aspirin 81 mg daily.  2.  Return in 4 months for follow-up with a CBC.  3.  If her platelet count reaches 1.5 million we will repeat von Willebrand's studies

## 2019-01-03 ENCOUNTER — OFFICE VISIT (OUTPATIENT)
Dept: ONCOLOGY | Facility: CLINIC | Age: 49
End: 2019-01-03

## 2019-01-03 ENCOUNTER — LAB (OUTPATIENT)
Dept: LAB | Facility: HOSPITAL | Age: 49
End: 2019-01-03

## 2019-01-03 VITALS
SYSTOLIC BLOOD PRESSURE: 102 MMHG | HEART RATE: 83 BPM | BODY MASS INDEX: 27.41 KG/M2 | OXYGEN SATURATION: 99 % | HEIGHT: 65 IN | DIASTOLIC BLOOD PRESSURE: 66 MMHG | WEIGHT: 164.5 LBS | TEMPERATURE: 98.4 F | RESPIRATION RATE: 16 BRPM

## 2019-01-03 DIAGNOSIS — D47.3 ESSENTIAL THROMBOCYTHEMIA (HCC): Primary | ICD-10-CM

## 2019-01-03 DIAGNOSIS — D56.3 BETA THALASSEMIA MINOR: ICD-10-CM

## 2019-01-03 DIAGNOSIS — D47.3 ESSENTIAL THROMBOCYTHEMIA (HCC): ICD-10-CM

## 2019-01-03 LAB
BASOPHILS # BLD AUTO: 0.07 10*3/MM3 (ref 0–0.1)
BASOPHILS NFR BLD AUTO: 0.8 % (ref 0–1.1)
DEPRECATED RDW RBC AUTO: 33.4 FL (ref 37–49)
EOSINOPHIL # BLD AUTO: 0.27 10*3/MM3 (ref 0–0.36)
EOSINOPHIL NFR BLD AUTO: 3 % (ref 1–5)
ERYTHROCYTE [DISTWIDTH] IN BLOOD BY AUTOMATED COUNT: 15.8 % (ref 11.7–14.5)
HCT VFR BLD AUTO: 32.4 % (ref 34–45)
HGB BLD-MCNC: 10.1 G/DL (ref 11.5–14.9)
IMM GRANULOCYTES # BLD AUTO: 0.06 10*3/MM3 (ref 0–0.03)
IMM GRANULOCYTES NFR BLD AUTO: 0.7 % (ref 0–0.5)
LYMPHOCYTES # BLD AUTO: 1.52 10*3/MM3 (ref 1–3.5)
LYMPHOCYTES NFR BLD AUTO: 17 % (ref 20–49)
MCH RBC QN AUTO: 19.5 PG (ref 27–33)
MCHC RBC AUTO-ENTMCNC: 31.2 G/DL (ref 32–35)
MCV RBC AUTO: 62.5 FL (ref 83–97)
MONOCYTES # BLD AUTO: 0.73 10*3/MM3 (ref 0.25–0.8)
MONOCYTES NFR BLD AUTO: 8.2 % (ref 4–12)
NEUTROPHILS # BLD AUTO: 6.28 10*3/MM3 (ref 1.5–7)
NEUTROPHILS NFR BLD AUTO: 70.3 % (ref 39–75)
NRBC BLD AUTO-RTO: 0 /100 WBC (ref 0–0)
PLATELET # BLD AUTO: 1044 10*3/MM3 (ref 150–375)
PMV BLD AUTO: 10 FL (ref 8.9–12.1)
RBC # BLD AUTO: 5.18 10*6/MM3 (ref 3.9–5)
WBC NRBC COR # BLD: 8.93 10*3/MM3 (ref 4–10)

## 2019-01-03 PROCEDURE — 36415 COLL VENOUS BLD VENIPUNCTURE: CPT | Performed by: INTERNAL MEDICINE

## 2019-01-03 PROCEDURE — 85025 COMPLETE CBC W/AUTO DIFF WBC: CPT | Performed by: INTERNAL MEDICINE

## 2019-01-03 PROCEDURE — 99213 OFFICE O/P EST LOW 20 MIN: CPT | Performed by: INTERNAL MEDICINE

## 2019-01-03 NOTE — PROGRESS NOTES
"[facility] GROUP OUTPATIENT FOLLOW UP CLINIC VISIT    REASON FOR FOLLOW-UP:    1.  Beta thalassemia minor.  2.  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    HISTORY OF PRESENT ILLNESS:  Mar Hill is a 48 y.o. female who returns today for follow up of the above issue.  She continues aspirin 81 mg daily.  She continues to do well.  She has some mild nasal congestion today but no fevers or chills.     PAST MEDICAL, SURGICAL, FAMILY, AND SOCIAL HISTORIES were reviewed with the patient and in the electronic medical record, and were updated if indicated.    ALLERGIES:  No Known Allergies    MEDICATIONS:  The medication list has been reviewed with the patient by the medical assistant, and the list has been updated in the electronic medical record, which I reviewed.  Medication dosages and frequencies were confirmed to be accurate.    REVIEW OF SYSTEMS:  PAIN:  See Vital Signs below.  GENERAL:  No fevers, chills, night sweats, or unintended weight loss.  SKIN:  No rashes or non-healing lesions.  HEME/LYMPH:  No abnormal bleeding.  No palpable lymphadenopathy.  EYES:  No vision changes or diplopia.  ENT:  No sore throat or difficulty swallowing.  Mild nasal congestion.  RESPIRATORY:  No cough, shortness of breath, hemoptysis, or wheezing.  CARDIOVASCULAR:  No chest pain, palpitations, orthopnea, or dyspnea on exertion.  GASTROINTESTINAL:  No abdominal pain, nausea, vomiting, constipation, diarrhea, melena, or hematochezia.  GENITOURINARY:  No dysuria or hematuria.  MUSCULOSKELETAL:  No muscle or joint pain.  NEUROLOGIC:  No dizziness, loss of consciousness, or seizures.  PSYCHIATRIC:  No depression, anxiety, or mood changes.  No change from prior except as noted    Vitals:    01/03/19 1128   BP: 102/66   Pulse: 83   Resp: 16   Temp: 98.4 °F (36.9 °C)   SpO2: 99%   Weight: 74.6 kg (164 lb 8 oz)   Height: 166 cm (65.35\")   PainSc: 0-No pain       PHYSICAL EXAMINATION:  GENERAL:  Well-developed " well-nourished female; awake, alert and oriented, in no acute distress.  SKIN:  Warm and dry, without rashes, purpura, or petechiae.  HEAD:  Normocephalic, atraumatic.  EYES:  Pupils equal, round and reactive to light.  Extraocular movements intact.  Conjunctivae normal.  EARS:  Hearing intact.  NOSE:  Septum midline.  No excoriations or nasal discharge.  MOUTH:  No stomatitis or ulcers.  Lips are normal.  THROAT:  Oropharynx without lesions or exudates.  NECK:  Supple with good range of motion; no thyromegaly or masses; no JVD or bruits.  LYMPHATICS:  No cervical, supraclavicular, axillary, or inguinal lymphadenopathy.  CHEST:  Lungs are clear to auscultation bilaterally.  No wheezes, rales, or rhonchi.  HEART:  Regular rate; normal rhythm.  No murmurs, gallops or rubs.  ABDOMEN:  Soft, NT, ND, NABS, spleen not palpable  EXTREMITIES:  No clubbing, cyanosis, or edema.  NEUROLOGICAL:  No focal neurologic deficits.  No change from prior except as noted    DIAGNOSTIC DATA:  Results for orders placed or performed in visit on 01/03/19   CBC Auto Differential   Result Value Ref Range    WBC 8.93 4.00 - 10.00 10*3/mm3    RBC 5.18 (H) 3.90 - 5.00 10*6/mm3    Hemoglobin 10.1 (L) 11.5 - 14.9 g/dL    Hematocrit 32.4 (L) 34.0 - 45.0 %    MCV 62.5 (L) 83.0 - 97.0 fL    MCH 19.5 (L) 27.0 - 33.0 pg    MCHC 31.2 (L) 32.0 - 35.0 g/dL    RDW 15.8 (H) 11.7 - 14.5 %    RDW-SD 33.4 (L) 37.0 - 49.0 fl    MPV 10.0 8.9 - 12.1 fL    Platelets 1,044 (C) 150 - 375 10*3/mm3    Neutrophil % 70.3 39.0 - 75.0 %    Lymphocyte % 17.0 (L) 20.0 - 49.0 %    Monocyte % 8.2 4.0 - 12.0 %    Eosinophil % 3.0 1.0 - 5.0 %    Basophil % 0.8 0.0 - 1.1 %    Immature Grans % 0.7 (H) 0.0 - 0.5 %    Neutrophils, Absolute 6.28 1.50 - 7.00 10*3/mm3    Lymphocytes, Absolute 1.52 1.00 - 3.50 10*3/mm3    Monocytes, Absolute 0.73 0.25 - 0.80 10*3/mm3    Eosinophils, Absolute 0.27 0.00 - 0.36 10*3/mm3    Basophils, Absolute 0.07 0.00 - 0.10 10*3/mm3    Immature Grans,  Absolute 0.06 (H) 0.00 - 0.03 10*3/mm3    nRBC 0.0 0.0 - 0.0 /100 WBC     Genetic testing was positive for a calreticulin mutation.  EMANUEL 2 and MPL mutation analyses were negative.    Von Willebrand's antigen and activity levels were normal on 5/6/2016.    ASSESSMENT:  This is a 48 y.o. female with:  1.  Essential thrombocythemia with a calreticulin mutation.  Her platelet count is stable today.   I believe that she is at low risk for thromboembolic complications.  She will continue aspirin 81 mg daily.  No other therapy at this time.    2.  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  Epistaxis which resolved without intervention other than very briefly interrupting the aspirin therapy    PLAN:  1.  Continue aspirin 81 mg daily.  2.  Return in 4 months for follow-up with a CBC.  3.  If her platelet count reaches 1.5 million we will repeat von Willebrand's studies

## 2019-01-24 ENCOUNTER — APPOINTMENT (OUTPATIENT)
Dept: WOMENS IMAGING | Facility: HOSPITAL | Age: 49
End: 2019-01-24

## 2019-01-24 PROCEDURE — 77063 BREAST TOMOSYNTHESIS BI: CPT | Performed by: RADIOLOGY

## 2019-01-24 PROCEDURE — 77067 SCR MAMMO BI INCL CAD: CPT | Performed by: RADIOLOGY

## 2019-05-09 ENCOUNTER — LAB (OUTPATIENT)
Dept: LAB | Facility: HOSPITAL | Age: 49
End: 2019-05-09

## 2019-05-09 ENCOUNTER — OFFICE VISIT (OUTPATIENT)
Dept: ONCOLOGY | Facility: CLINIC | Age: 49
End: 2019-05-09

## 2019-05-09 VITALS
DIASTOLIC BLOOD PRESSURE: 74 MMHG | HEIGHT: 65 IN | SYSTOLIC BLOOD PRESSURE: 119 MMHG | OXYGEN SATURATION: 98 % | TEMPERATURE: 98.6 F | WEIGHT: 166.1 LBS | BODY MASS INDEX: 27.67 KG/M2 | HEART RATE: 76 BPM | RESPIRATION RATE: 12 BRPM

## 2019-05-09 DIAGNOSIS — D47.3 ESSENTIAL THROMBOCYTHEMIA (HCC): ICD-10-CM

## 2019-05-09 DIAGNOSIS — D47.3 ESSENTIAL THROMBOCYTHEMIA (HCC): Primary | ICD-10-CM

## 2019-05-09 DIAGNOSIS — D56.3 BETA THALASSEMIA MINOR: ICD-10-CM

## 2019-05-09 LAB
BASOPHILS # BLD AUTO: 0.09 10*3/MM3 (ref 0–0.2)
BASOPHILS NFR BLD AUTO: 0.8 % (ref 0–1.5)
DEPRECATED RDW RBC AUTO: 33.7 FL (ref 37–54)
EOSINOPHIL # BLD AUTO: 0.32 10*3/MM3 (ref 0–0.4)
EOSINOPHIL NFR BLD AUTO: 2.8 % (ref 0.3–6.2)
ERYTHROCYTE [DISTWIDTH] IN BLOOD BY AUTOMATED COUNT: 17.3 % (ref 12.3–15.4)
HCT VFR BLD AUTO: 35.4 % (ref 34–46.6)
HGB BLD-MCNC: 11.1 G/DL (ref 12–15.9)
IMM GRANULOCYTES # BLD AUTO: 0.05 10*3/MM3 (ref 0–0.05)
IMM GRANULOCYTES NFR BLD AUTO: 0.4 % (ref 0–0.5)
LYMPHOCYTES # BLD AUTO: 2.17 10*3/MM3 (ref 0.7–3.1)
LYMPHOCYTES NFR BLD AUTO: 19.3 % (ref 19.6–45.3)
MCH RBC QN AUTO: 19.4 PG (ref 26.6–33)
MCHC RBC AUTO-ENTMCNC: 31.4 G/DL (ref 31.5–35.7)
MCV RBC AUTO: 61.8 FL (ref 79–97)
MONOCYTES # BLD AUTO: 0.85 10*3/MM3 (ref 0.1–0.9)
MONOCYTES NFR BLD AUTO: 7.5 % (ref 5–12)
NEUTROPHILS # BLD AUTO: 7.78 10*3/MM3 (ref 1.7–7)
NEUTROPHILS NFR BLD AUTO: 69.2 % (ref 42.7–76)
NRBC BLD AUTO-RTO: 0 /100 WBC (ref 0–0.2)
PLATELET # BLD AUTO: 1221 10*3/MM3 (ref 140–450)
PMV BLD AUTO: 9.8 FL (ref 6–12)
RBC # BLD AUTO: 5.73 10*6/MM3 (ref 3.77–5.28)
WBC NRBC COR # BLD: 11.26 10*3/MM3 (ref 3.4–10.8)

## 2019-05-09 PROCEDURE — 36416 COLLJ CAPILLARY BLOOD SPEC: CPT | Performed by: INTERNAL MEDICINE

## 2019-05-09 PROCEDURE — 99213 OFFICE O/P EST LOW 20 MIN: CPT | Performed by: INTERNAL MEDICINE

## 2019-05-09 PROCEDURE — 85025 COMPLETE CBC W/AUTO DIFF WBC: CPT | Performed by: INTERNAL MEDICINE

## 2019-05-09 NOTE — PROGRESS NOTES
"[facility] GROUP OUTPATIENT FOLLOW UP CLINIC VISIT    REASON FOR FOLLOW-UP:    1.  Beta thalassemia minor.  2.  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    HISTORY OF PRESENT ILLNESS:  Mar Hill is a 49 y.o. female who returns today for follow up of the above issue.  She continues aspirin 81 mg daily.  She continues to do well.  No new problems today.  No bleeding.  No new pain.      PAST MEDICAL, SURGICAL, FAMILY, AND SOCIAL HISTORIES were reviewed with the patient and in the electronic medical record, and were updated if indicated.    ALLERGIES:  No Known Allergies    MEDICATIONS:  The medication list has been reviewed with the patient by the medical assistant, and the list has been updated in the electronic medical record, which I reviewed.  Medication dosages and frequencies were confirmed to be accurate.    REVIEW OF SYSTEMS:  PAIN:  See Vital Signs below.  GENERAL:  No fevers, chills, night sweats, or unintended weight loss.  SKIN:  No rashes or non-healing lesions.  HEME/LYMPH:  No abnormal bleeding.  No palpable lymphadenopathy.  EYES:  No vision changes or diplopia.  ENT:  No sore throat or difficulty swallowing.   RESPIRATORY:  No cough, shortness of breath, hemoptysis, or wheezing.  CARDIOVASCULAR:  No chest pain, palpitations, orthopnea, or dyspnea on exertion.  GASTROINTESTINAL:  No abdominal pain, nausea, vomiting, constipation, diarrhea, melena, or hematochezia.  GENITOURINARY:  No dysuria or hematuria.  MUSCULOSKELETAL:  No muscle or joint pain.  NEUROLOGIC:  No dizziness, loss of consciousness, or seizures.  PSYCHIATRIC:  No depression, anxiety, or mood changes.  No change from prior except as noted    Vitals:    05/09/19 1148   BP: 119/74   Pulse: 76   Resp: 12   Temp: 98.6 °F (37 °C)   TempSrc: Oral   SpO2: 98%   Weight: 75.3 kg (166 lb 1.6 oz)   Height: 165.5 cm (65.16\")   PainSc: 0-No pain       PHYSICAL EXAMINATION:  GENERAL:  Well-developed well-nourished " female; awake, alert and oriented, in no acute distress.  SKIN:  Warm and dry, without rashes, purpura, or petechiae.  HEAD:  Normocephalic, atraumatic.  EARS:  Hearing intact.  NOSE:  Septum midline.  No excoriations or nasal discharge.  MOUTH:  No stomatitis or ulcers.  Lips are normal.  THROAT:  Oropharynx without lesions or exudates.  LYMPHATICS:  No cervical, supraclavicular, axillary lymphadenopathy.  CHEST:  Lungs are clear to auscultation bilaterally.  No wheezes, rales, or rhonchi.  HEART:  Regular rate; normal rhythm.  No murmurs, gallops or rubs.  ABDOMEN:  Not examined today  EXTREMITIES:  No clubbing, cyanosis, or edema.  NEUROLOGICAL:  No focal neurologic deficits.  No change from prior except as noted    DIAGNOSTIC DATA:  Results for orders placed or performed in visit on 05/09/19   CBC Auto Differential   Result Value Ref Range    WBC 11.26 (H) 3.40 - 10.80 10*3/mm3    RBC 5.73 (H) 3.77 - 5.28 10*6/mm3    Hemoglobin 11.1 (L) 12.0 - 15.9 g/dL    Hematocrit 35.4 34.0 - 46.6 %    MCV 61.8 (L) 79.0 - 97.0 fL    MCH 19.4 (L) 26.6 - 33.0 pg    MCHC 31.4 (L) 31.5 - 35.7 g/dL    RDW 17.3 (H) 12.3 - 15.4 %    RDW-SD 33.7 (L) 37.0 - 54.0 fl    MPV 9.8 6.0 - 12.0 fL    Platelets 1,221 (C) 140 - 450 10*3/mm3    Neutrophil % 69.2 42.7 - 76.0 %    Lymphocyte % 19.3 (L) 19.6 - 45.3 %    Monocyte % 7.5 5.0 - 12.0 %    Eosinophil % 2.8 0.3 - 6.2 %    Basophil % 0.8 0.0 - 1.5 %    Immature Grans % 0.4 0.0 - 0.5 %    Neutrophils, Absolute 7.78 (H) 1.70 - 7.00 10*3/mm3    Lymphocytes, Absolute 2.17 0.70 - 3.10 10*3/mm3    Monocytes, Absolute 0.85 0.10 - 0.90 10*3/mm3    Eosinophils, Absolute 0.32 0.00 - 0.40 10*3/mm3    Basophils, Absolute 0.09 0.00 - 0.20 10*3/mm3    Immature Grans, Absolute 0.05 0.00 - 0.05 10*3/mm3    nRBC 0.0 0.0 - 0.2 /100 WBC     Genetic testing was positive for a calreticulin mutation.  EMANUEL 2 and MPL mutation analyses were negative.    Von Willebrand's antigen and activity levels were normal on  5/6/2016.    ASSESSMENT:  This is a 49 y.o. female with:  1.  Essential thrombocythemia with a calreticulin mutation.  Her platelet count is stable.   I believe that she is at low risk for thromboembolic complications.  She will continue aspirin 81 mg daily.  No other therapy at this time.    2.  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  Epistaxis which resolved without intervention other than very briefly interrupting the aspirin therapy    PLAN:  1.  Continue aspirin 81 mg daily.  2.  Return in 4 months for follow-up with a CBC.  3.  If her platelet count reaches 1.5 million we will repeat von Willebrand's studies

## 2019-08-16 NOTE — PROGRESS NOTES
This patient calls with bloody sinus drainage that she feels tickle in her throat. She has coughed up some of this and has noted increased allergy symptoms as of late. This may be her allergic irritation with bleeding accentuated by dysfunctional platelets and additional aspirin affect. I have asked her to discontinue full dose aspirin, use decongestants twice daily orally and five days from now start baby aspirin. She'll also use saline nasal spray to moisten her nasal membranes over the next several days. She will contact us for any additional issues. Calm

## 2019-09-09 ENCOUNTER — LAB (OUTPATIENT)
Dept: LAB | Facility: HOSPITAL | Age: 49
End: 2019-09-09

## 2019-09-09 ENCOUNTER — OFFICE VISIT (OUTPATIENT)
Dept: ONCOLOGY | Facility: CLINIC | Age: 49
End: 2019-09-09

## 2019-09-09 VITALS
HEART RATE: 81 BPM | BODY MASS INDEX: 28.69 KG/M2 | WEIGHT: 172.2 LBS | RESPIRATION RATE: 16 BRPM | DIASTOLIC BLOOD PRESSURE: 70 MMHG | TEMPERATURE: 98.4 F | SYSTOLIC BLOOD PRESSURE: 116 MMHG | HEIGHT: 65 IN | OXYGEN SATURATION: 100 %

## 2019-09-09 DIAGNOSIS — D47.3 ESSENTIAL THROMBOCYTHEMIA (HCC): ICD-10-CM

## 2019-09-09 DIAGNOSIS — D47.3 ESSENTIAL THROMBOCYTHEMIA (HCC): Primary | ICD-10-CM

## 2019-09-09 LAB
BASOPHILS # BLD AUTO: 0.1 10*3/MM3 (ref 0–0.2)
BASOPHILS NFR BLD AUTO: 0.9 % (ref 0–1.5)
DEPRECATED RDW RBC AUTO: 35.7 FL (ref 37–54)
EOSINOPHIL # BLD AUTO: 0.51 10*3/MM3 (ref 0–0.4)
EOSINOPHIL NFR BLD AUTO: 4.5 % (ref 0.3–6.2)
ERYTHROCYTE [DISTWIDTH] IN BLOOD BY AUTOMATED COUNT: 17.2 % (ref 12.3–15.4)
HCT VFR BLD AUTO: 35.7 % (ref 34–46.6)
HGB BLD-MCNC: 10.9 G/DL (ref 12–15.9)
IMM GRANULOCYTES # BLD AUTO: 0.07 10*3/MM3 (ref 0–0.05)
IMM GRANULOCYTES NFR BLD AUTO: 0.6 % (ref 0–0.5)
LYMPHOCYTES # BLD AUTO: 2.36 10*3/MM3 (ref 0.7–3.1)
LYMPHOCYTES NFR BLD AUTO: 20.6 % (ref 19.6–45.3)
MCH RBC QN AUTO: 19.5 PG (ref 26.6–33)
MCHC RBC AUTO-ENTMCNC: 30.5 G/DL (ref 31.5–35.7)
MCV RBC AUTO: 63.8 FL (ref 79–97)
MONOCYTES # BLD AUTO: 0.6 10*3/MM3 (ref 0.1–0.9)
MONOCYTES NFR BLD AUTO: 5.2 % (ref 5–12)
NEUTROPHILS # BLD AUTO: 7.79 10*3/MM3 (ref 1.7–7)
NEUTROPHILS NFR BLD AUTO: 68.2 % (ref 42.7–76)
NRBC BLD AUTO-RTO: 0 /100 WBC (ref 0–0.2)
PLATELET # BLD AUTO: 1177 10*3/MM3 (ref 140–450)
PMV BLD AUTO: 9.8 FL (ref 6–12)
RBC # BLD AUTO: 5.6 10*6/MM3 (ref 3.77–5.28)
WBC NRBC COR # BLD: 11.43 10*3/MM3 (ref 3.4–10.8)

## 2019-09-09 PROCEDURE — 36415 COLL VENOUS BLD VENIPUNCTURE: CPT | Performed by: INTERNAL MEDICINE

## 2019-09-09 PROCEDURE — 85025 COMPLETE CBC W/AUTO DIFF WBC: CPT | Performed by: INTERNAL MEDICINE

## 2019-09-09 PROCEDURE — G0463 HOSPITAL OUTPT CLINIC VISIT: HCPCS | Performed by: INTERNAL MEDICINE

## 2019-09-09 PROCEDURE — 99213 OFFICE O/P EST LOW 20 MIN: CPT | Performed by: INTERNAL MEDICINE

## 2019-09-09 NOTE — PROGRESS NOTES
"[facility] GROUP OUTPATIENT FOLLOW UP CLINIC VISIT    REASON FOR FOLLOW-UP:    1.  Beta thalassemia minor.  2.  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    HISTORY OF PRESENT ILLNESS:  Mar Hill is a 49 y.o. female who returns today for follow up of the above issue.  She continues aspirin 81 mg daily.  She continues to do well.  No new problems today.  No new changes.      PAST MEDICAL, SURGICAL, FAMILY, AND SOCIAL HISTORIES were reviewed with the patient and in the electronic medical record, and were updated if indicated.    ALLERGIES:  No Known Allergies    MEDICATIONS:  The medication list has been reviewed with the patient by the medical assistant, and the list has been updated in the electronic medical record, which I reviewed.  Medication dosages and frequencies were confirmed to be accurate.    REVIEW OF SYSTEMS:  PAIN:  See Vital Signs below.  GENERAL:  No fevers, chills, night sweats, or unintended weight loss.  SKIN:  No rashes or non-healing lesions.  HEME/LYMPH:  No abnormal bleeding.  No palpable lymphadenopathy.  EYES:  No vision changes or diplopia.  ENT:  No sore throat or difficulty swallowing.   RESPIRATORY:  No cough, shortness of breath, hemoptysis, or wheezing.  CARDIOVASCULAR:  No chest pain, palpitations, orthopnea, or dyspnea on exertion.  GASTROINTESTINAL:  No abdominal pain, nausea, vomiting, constipation, diarrhea, melena, or hematochezia.  GENITOURINARY:  No dysuria or hematuria.  MUSCULOSKELETAL:  No muscle or joint pain.  NEUROLOGIC:  No dizziness, loss of consciousness, or seizures.  PSYCHIATRIC:  No depression, anxiety, or mood changes.  No change from prior except as noted    Vitals:    09/09/19 1402   BP: 116/70   Pulse: 81   Resp: 16   Temp: 98.4 °F (36.9 °C)   TempSrc: Oral   SpO2: 100%   Weight: 78.1 kg (172 lb 3.2 oz)   Height: 165.5 cm (65.16\")   PainSc: 0-No pain  Comment: thrombocythemia       PHYSICAL EXAMINATION:  GENERAL:  Well-developed " well-nourished female; awake, alert and oriented, in no acute distress.  SKIN:  Warm and dry, without rashes, purpura, or petechiae.  HEAD:  Normocephalic, atraumatic.  EARS:  Hearing intact.  NOSE:  Septum midline.  No excoriations or nasal discharge.  MOUTH:  No stomatitis or ulcers.  Lips are normal.  THROAT:  Oropharynx without lesions or exudates.  LYMPHATICS:  No cervical, supraclavicular, axillary lymphadenopathy.  CHEST:  Lungs are clear to auscultation bilaterally.  No wheezes, rales, or rhonchi.  HEART:  Regular rate; normal rhythm.  No murmurs, gallops or rubs.  ABDOMEN:  Not examined today  EXTREMITIES:  No clubbing, cyanosis, or edema.  NEUROLOGICAL:  No focal neurologic deficits.  No change from prior except as noted    DIAGNOSTIC DATA:  Results for orders placed or performed in visit on 09/09/19   CBC Auto Differential   Result Value Ref Range    WBC 11.43 (H) 3.40 - 10.80 10*3/mm3    RBC 5.60 (H) 3.77 - 5.28 10*6/mm3    Hemoglobin 10.9 (L) 12.0 - 15.9 g/dL    Hematocrit 35.7 34.0 - 46.6 %    MCV 63.8 (L) 79.0 - 97.0 fL    MCH 19.5 (L) 26.6 - 33.0 pg    MCHC 30.5 (L) 31.5 - 35.7 g/dL    RDW 17.2 (H) 12.3 - 15.4 %    RDW-SD 35.7 (L) 37.0 - 54.0 fl    MPV 9.8 6.0 - 12.0 fL    Platelets 1,177 (C) 140 - 450 10*3/mm3    Neutrophil % 68.2 42.7 - 76.0 %    Lymphocyte % 20.6 19.6 - 45.3 %    Monocyte % 5.2 5.0 - 12.0 %    Eosinophil % 4.5 0.3 - 6.2 %    Basophil % 0.9 0.0 - 1.5 %    Immature Grans % 0.6 (H) 0.0 - 0.5 %    Neutrophils, Absolute 7.79 (H) 1.70 - 7.00 10*3/mm3    Lymphocytes, Absolute 2.36 0.70 - 3.10 10*3/mm3    Monocytes, Absolute 0.60 0.10 - 0.90 10*3/mm3    Eosinophils, Absolute 0.51 (H) 0.00 - 0.40 10*3/mm3    Basophils, Absolute 0.10 0.00 - 0.20 10*3/mm3    Immature Grans, Absolute 0.07 (H) 0.00 - 0.05 10*3/mm3    nRBC 0.0 0.0 - 0.2 /100 WBC     Genetic testing was positive for a calreticulin mutation.  EMANUEL 2 and MPL mutation analyses were negative.    Von Willebrand's antigen and activity  levels were normal on 5/6/2016.    ASSESSMENT:  This is a 49 y.o. female with:  1.  Essential thrombocythemia with a calreticulin mutation.  Her platelet count is stable.   She is at low risk for thromboembolic complications.  She will continue aspirin 81 mg daily.  No other therapy at this time.    2.  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  Epistaxis which resolved without intervention other than very briefly interrupting the aspirin therapy    PLAN:  1.  Continue aspirin 81 mg daily.  2.  Return in about 4 months for follow-up with a CBC.  3.  If her platelet count reaches 1.5 million we will repeat von Willebrand's studies

## 2020-01-07 ENCOUNTER — LAB (OUTPATIENT)
Dept: LAB | Facility: HOSPITAL | Age: 50
End: 2020-01-07

## 2020-01-07 ENCOUNTER — OFFICE VISIT (OUTPATIENT)
Dept: ONCOLOGY | Facility: CLINIC | Age: 50
End: 2020-01-07

## 2020-01-07 VITALS
BODY MASS INDEX: 27.27 KG/M2 | SYSTOLIC BLOOD PRESSURE: 130 MMHG | OXYGEN SATURATION: 100 % | HEART RATE: 85 BPM | HEIGHT: 65 IN | RESPIRATION RATE: 16 BRPM | TEMPERATURE: 98.4 F | DIASTOLIC BLOOD PRESSURE: 75 MMHG | WEIGHT: 163.7 LBS

## 2020-01-07 DIAGNOSIS — D47.3 ESSENTIAL THROMBOCYTHEMIA (HCC): Primary | ICD-10-CM

## 2020-01-07 LAB
BASOPHILS # BLD AUTO: 0.09 10*3/MM3 (ref 0–0.2)
BASOPHILS NFR BLD AUTO: 0.8 % (ref 0–1.5)
DEPRECATED RDW RBC AUTO: 35 FL (ref 37–54)
EOSINOPHIL # BLD AUTO: 0.49 10*3/MM3 (ref 0–0.4)
EOSINOPHIL NFR BLD AUTO: 4.2 % (ref 0.3–6.2)
ERYTHROCYTE [DISTWIDTH] IN BLOOD BY AUTOMATED COUNT: 16.4 % (ref 12.3–15.4)
HCT VFR BLD AUTO: 33.8 % (ref 34–46.6)
HGB BLD-MCNC: 10.8 G/DL (ref 12–15.9)
IMM GRANULOCYTES # BLD AUTO: 0.07 10*3/MM3 (ref 0–0.05)
IMM GRANULOCYTES NFR BLD AUTO: 0.6 % (ref 0–0.5)
LYMPHOCYTES # BLD AUTO: 2.26 10*3/MM3 (ref 0.7–3.1)
LYMPHOCYTES NFR BLD AUTO: 19.5 % (ref 19.6–45.3)
MCH RBC QN AUTO: 20.1 PG (ref 26.6–33)
MCHC RBC AUTO-ENTMCNC: 32 G/DL (ref 31.5–35.7)
MCV RBC AUTO: 62.9 FL (ref 79–97)
MONOCYTES # BLD AUTO: 0.81 10*3/MM3 (ref 0.1–0.9)
MONOCYTES NFR BLD AUTO: 7 % (ref 5–12)
NEUTROPHILS # BLD AUTO: 7.88 10*3/MM3 (ref 1.7–7)
NEUTROPHILS NFR BLD AUTO: 67.9 % (ref 42.7–76)
NRBC BLD AUTO-RTO: 0 /100 WBC (ref 0–0.2)
PLATELET # BLD AUTO: 1229 10*3/MM3 (ref 140–450)
PMV BLD AUTO: 9.7 FL (ref 6–12)
RBC # BLD AUTO: 5.37 10*6/MM3 (ref 3.77–5.28)
WBC NRBC COR # BLD: 11.6 10*3/MM3 (ref 3.4–10.8)

## 2020-01-07 PROCEDURE — 85025 COMPLETE CBC W/AUTO DIFF WBC: CPT

## 2020-01-07 PROCEDURE — 36415 COLL VENOUS BLD VENIPUNCTURE: CPT

## 2020-01-07 PROCEDURE — 99213 OFFICE O/P EST LOW 20 MIN: CPT | Performed by: INTERNAL MEDICINE

## 2020-01-07 RX ORDER — SERTRALINE HYDROCHLORIDE 25 MG/1
25 TABLET, FILM COATED ORAL DAILY
COMMUNITY
Start: 2019-12-05

## 2020-01-07 NOTE — PROGRESS NOTES
"[facility] GROUP OUTPATIENT FOLLOW UP CLINIC VISIT    REASON FOR FOLLOW-UP:    1.  Beta thalassemia minor.  2.  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    HISTORY OF PRESENT ILLNESS:  Mar Hill is a 49 y.o. female who returns today for follow up of the above issue.  She continues aspirin 81 mg daily.  She continues to do well without any new issues to report today.  No bleeding.  No abdominal pain.    PAST MEDICAL, SURGICAL, FAMILY, AND SOCIAL HISTORIES were reviewed with the patient and in the electronic medical record, and were updated if indicated.    ALLERGIES:  No Known Allergies    MEDICATIONS:  The medication list has been reviewed with the patient by the medical assistant, and the list has been updated in the electronic medical record, which I reviewed.  Medication dosages and frequencies were confirmed to be accurate.    REVIEW OF SYSTEMS:  PAIN:  See Vital Signs below.  GENERAL:  No fevers, chills, night sweats, or unintended weight loss.  SKIN:  No rashes or non-healing lesions.  HEME/LYMPH:  No abnormal bleeding.  No palpable lymphadenopathy.  EYES:  No vision changes or diplopia.  ENT:  No sore throat or difficulty swallowing.   RESPIRATORY:  No cough, shortness of breath, hemoptysis, or wheezing.  CARDIOVASCULAR:  No chest pain, palpitations, orthopnea, or dyspnea on exertion.  GASTROINTESTINAL:  No abdominal pain, nausea, vomiting, constipation, diarrhea, melena, or hematochezia.  GENITOURINARY:  No dysuria or hematuria.  MUSCULOSKELETAL:  No muscle or joint pain.  NEUROLOGIC:  No dizziness, loss of consciousness, or seizures.  PSYCHIATRIC:  No depression, anxiety, or mood changes.  No change from prior except as noted    Vitals:    01/07/20 1253   BP: 130/75   Pulse: 85   Resp: 16   Temp: 98.4 °F (36.9 °C)   TempSrc: Oral   SpO2: 100%   Weight: 74.3 kg (163 lb 11.2 oz)   Height: 165.5 cm (65.16\")   PainSc: 0-No pain  Comment: thrombocytopenia       PHYSICAL " EXAMINATION:  GENERAL:  Well-developed well-nourished female; awake, alert and oriented, in no acute distress.  SKIN:  Warm and dry, without rashes, purpura, or petechiae.  HEAD:  Normocephalic, atraumatic.  EARS:  Hearing intact.  NOSE:  Septum midline.  No excoriations or nasal discharge.  MOUTH:  No stomatitis or ulcers.  Lips are normal.  THROAT:  Oropharynx without lesions or exudates.  LYMPHATICS:  No cervical, supraclavicular, axillary lymphadenopathy.  CHEST:  Lungs are clear to auscultation bilaterally.  No wheezes, rales, or rhonchi.  HEART:  Regular rate; normal rhythm.  No murmurs, gallops or rubs.  ABDOMEN:   Examined today  EXTREMITIES:  No clubbing, cyanosis, or edema.  NEUROLOGICAL:  No focal neurologic deficits.  No change from prior except as noted    DIAGNOSTIC DATA:  Results for orders placed or performed in visit on 01/07/20   CBC Auto Differential   Result Value Ref Range    WBC 11.60 (H) 3.40 - 10.80 10*3/mm3    RBC 5.37 (H) 3.77 - 5.28 10*6/mm3    Hemoglobin 10.8 (L) 12.0 - 15.9 g/dL    Hematocrit 33.8 (L) 34.0 - 46.6 %    MCV 62.9 (L) 79.0 - 97.0 fL    MCH 20.1 (L) 26.6 - 33.0 pg    MCHC 32.0 31.5 - 35.7 g/dL    RDW 16.4 (H) 12.3 - 15.4 %    RDW-SD 35.0 (L) 37.0 - 54.0 fl    MPV 9.7 6.0 - 12.0 fL    Platelets 1,229 (C) 140 - 450 10*3/mm3    Neutrophil % 67.9 42.7 - 76.0 %    Lymphocyte % 19.5 (L) 19.6 - 45.3 %    Monocyte % 7.0 5.0 - 12.0 %    Eosinophil % 4.2 0.3 - 6.2 %    Basophil % 0.8 0.0 - 1.5 %    Immature Grans % 0.6 (H) 0.0 - 0.5 %    Neutrophils, Absolute 7.88 (H) 1.70 - 7.00 10*3/mm3    Lymphocytes, Absolute 2.26 0.70 - 3.10 10*3/mm3    Monocytes, Absolute 0.81 0.10 - 0.90 10*3/mm3    Eosinophils, Absolute 0.49 (H) 0.00 - 0.40 10*3/mm3    Basophils, Absolute 0.09 0.00 - 0.20 10*3/mm3    Immature Grans, Absolute 0.07 (H) 0.00 - 0.05 10*3/mm3    nRBC 0.0 0.0 - 0.2 /100 WBC     Genetic testing was positive for a calreticulin mutation.  EMANUEL 2 and MPL mutation analyses were  negative.    Von Willebrand's antigen and activity levels were normal on 5/6/2016.    ASSESSMENT:  This is a 49 y.o. female with:  1.  Essential thrombocythemia with a calreticulin mutation.  Her platelet count is stable.   She is at low risk for thromboembolic complications.  She will continue aspirin 81 mg daily.  No other therapy at this time.    2.  Beta thalassemia trait with mild microcytic anemia:  chronic and stable    PLAN:  1.  Continue aspirin 81 mg daily.  2.  Return in about 4 months for follow-up with a CBC.  3.  If her platelet count reaches 1.5 million we will repeat von Willebrand's studies

## 2020-01-28 ENCOUNTER — APPOINTMENT (OUTPATIENT)
Dept: WOMENS IMAGING | Facility: HOSPITAL | Age: 50
End: 2020-01-28

## 2020-01-28 PROCEDURE — 77067 SCR MAMMO BI INCL CAD: CPT | Performed by: RADIOLOGY

## 2020-01-28 PROCEDURE — 77063 BREAST TOMOSYNTHESIS BI: CPT | Performed by: RADIOLOGY

## 2020-04-28 ENCOUNTER — APPOINTMENT (OUTPATIENT)
Dept: LAB | Facility: HOSPITAL | Age: 50
End: 2020-04-28

## 2020-07-07 ENCOUNTER — OFFICE VISIT (OUTPATIENT)
Dept: ONCOLOGY | Facility: CLINIC | Age: 50
End: 2020-07-07

## 2020-07-07 ENCOUNTER — LAB (OUTPATIENT)
Dept: LAB | Facility: HOSPITAL | Age: 50
End: 2020-07-07

## 2020-07-07 VITALS
HEART RATE: 81 BPM | BODY MASS INDEX: 29.02 KG/M2 | HEIGHT: 65 IN | OXYGEN SATURATION: 95 % | RESPIRATION RATE: 16 BRPM | SYSTOLIC BLOOD PRESSURE: 127 MMHG | TEMPERATURE: 98.4 F | DIASTOLIC BLOOD PRESSURE: 71 MMHG | WEIGHT: 174.2 LBS

## 2020-07-07 DIAGNOSIS — D47.3 ESSENTIAL THROMBOCYTHEMIA (HCC): ICD-10-CM

## 2020-07-07 DIAGNOSIS — D56.3 BETA THALASSEMIA MINOR: ICD-10-CM

## 2020-07-07 DIAGNOSIS — D47.3 ESSENTIAL THROMBOCYTHEMIA (HCC): Primary | ICD-10-CM

## 2020-07-07 LAB
BASOPHILS # BLD AUTO: 0.13 10*3/MM3 (ref 0–0.2)
BASOPHILS NFR BLD AUTO: 1.1 % (ref 0–1.5)
DEPRECATED RDW RBC AUTO: 34.4 FL (ref 37–54)
EOSINOPHIL # BLD AUTO: 0.43 10*3/MM3 (ref 0–0.4)
EOSINOPHIL NFR BLD AUTO: 3.7 % (ref 0.3–6.2)
ERYTHROCYTE [DISTWIDTH] IN BLOOD BY AUTOMATED COUNT: 17.6 % (ref 12.3–15.4)
HCT VFR BLD AUTO: 35.1 % (ref 34–46.6)
HGB BLD-MCNC: 10.9 G/DL (ref 12–15.9)
IMM GRANULOCYTES # BLD AUTO: 0.09 10*3/MM3 (ref 0–0.05)
IMM GRANULOCYTES NFR BLD AUTO: 0.8 % (ref 0–0.5)
LYMPHOCYTES # BLD AUTO: 2.27 10*3/MM3 (ref 0.7–3.1)
LYMPHOCYTES NFR BLD AUTO: 19.3 % (ref 19.6–45.3)
MCH RBC QN AUTO: 19 PG (ref 26.6–33)
MCHC RBC AUTO-ENTMCNC: 31.1 G/DL (ref 31.5–35.7)
MCV RBC AUTO: 61.1 FL (ref 79–97)
MONOCYTES # BLD AUTO: 0.97 10*3/MM3 (ref 0.1–0.9)
MONOCYTES NFR BLD AUTO: 8.3 % (ref 5–12)
NEUTROPHILS NFR BLD AUTO: 66.8 % (ref 42.7–76)
NEUTROPHILS NFR BLD AUTO: 7.85 10*3/MM3 (ref 1.7–7)
NRBC BLD AUTO-RTO: 0 /100 WBC (ref 0–0.2)
PLATELET # BLD AUTO: 1330 10*3/MM3 (ref 140–450)
PMV BLD AUTO: 10.1 FL (ref 6–12)
RBC # BLD AUTO: 5.74 10*6/MM3 (ref 3.77–5.28)
WBC # BLD AUTO: 11.74 10*3/MM3 (ref 3.4–10.8)

## 2020-07-07 PROCEDURE — 85025 COMPLETE CBC W/AUTO DIFF WBC: CPT

## 2020-07-07 PROCEDURE — 99213 OFFICE O/P EST LOW 20 MIN: CPT | Performed by: INTERNAL MEDICINE

## 2020-07-07 PROCEDURE — 36415 COLL VENOUS BLD VENIPUNCTURE: CPT

## 2020-07-07 NOTE — PROGRESS NOTES
"[facility] GROUP OUTPATIENT FOLLOW UP CLINIC VISIT    REASON FOR FOLLOW-UP:    1.  Beta thalassemia minor.  2.  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    HISTORY OF PRESENT ILLNESS:  Mar Hill is a 50 y.o. female who returns today for follow up of the above issue.      She feels well.  She continues aspirin 81 mg daily.  She has some varicose veins on the right lower extremity with a little nodule there.  No bleeding.  No other evidence for thrombosis.    PAST MEDICAL, SURGICAL, FAMILY, AND SOCIAL HISTORIES were reviewed with the patient and in the electronic medical record, and were updated if indicated.    ALLERGIES:  No Known Allergies    MEDICATIONS:  The medication list has been reviewed with the patient by the medical assistant, and the list has been updated in the electronic medical record, which I reviewed.  Medication dosages and frequencies were confirmed to be accurate.    REVIEW OF SYSTEMS:  PAIN:  See Vital Signs below.  GENERAL:  No fevers, chills, night sweats, or unintended weight loss.  SKIN:  No rashes or non-healing lesions.  HEME/LYMPH:  No abnormal bleeding.  No palpable lymphadenopathy.  EYES:  No vision changes or diplopia.  ENT:  No sore throat or difficulty swallowing.   RESPIRATORY:  No cough, shortness of breath, hemoptysis, or wheezing.  CARDIOVASCULAR:  No chest pain, palpitations, orthopnea, or dyspnea on exertion.  GASTROINTESTINAL:  No abdominal pain, nausea, vomiting, constipation, diarrhea, melena, or hematochezia.  GENITOURINARY:  No dysuria or hematuria.  MUSCULOSKELETAL:  No muscle or joint pain.  NEUROLOGIC:  No dizziness, loss of consciousness, or seizures.  PSYCHIATRIC:  No depression, anxiety, or mood changes.  No change from prior except as noted    Vitals:    07/07/20 1124   BP: 127/71   Pulse: 81   Resp: 16   Temp: 98.4 °F (36.9 °C)   TempSrc: Temporal   SpO2: 95%   Weight: 79 kg (174 lb 3.2 oz)   Height: 165.5 cm (65.16\")   PainSc: 0-No " pain  Comment: essential thrombocytopenia       PHYSICAL EXAMINATION:  General:  No acute distress, awake, alert and oriented  Skin:  Warm and dry, no visible rash  HEENT:  normocephalic/atraumatic.  Wearing a mask.  Chest:  Normal respiratory effort  Extremities:  No visible clubbing, cyanosis, or edema.  Some varicose veins present on the right lower extremity below the knee  Neuro/psych:  Grossly non-focal.  Normal mood and affect.        DIAGNOSTIC DATA:  Results for orders placed or performed in visit on 07/07/20   CBC Auto Differential   Result Value Ref Range    WBC 11.74 (H) 3.40 - 10.80 10*3/mm3    RBC 5.74 (H) 3.77 - 5.28 10*6/mm3    Hemoglobin 10.9 (L) 12.0 - 15.9 g/dL    Hematocrit 35.1 34.0 - 46.6 %    MCV 61.1 (L) 79.0 - 97.0 fL    MCH 19.0 (L) 26.6 - 33.0 pg    MCHC 31.1 (L) 31.5 - 35.7 g/dL    RDW 17.6 (H) 12.3 - 15.4 %    RDW-SD 34.4 (L) 37.0 - 54.0 fl    MPV 10.1 6.0 - 12.0 fL    Platelets 1,330 (C) 140 - 450 10*3/mm3    Neutrophil % 66.8 42.7 - 76.0 %    Lymphocyte % 19.3 (L) 19.6 - 45.3 %    Monocyte % 8.3 5.0 - 12.0 %    Eosinophil % 3.7 0.3 - 6.2 %    Basophil % 1.1 0.0 - 1.5 %    Immature Grans % 0.8 (H) 0.0 - 0.5 %    Neutrophils, Absolute 7.85 (H) 1.70 - 7.00 10*3/mm3    Lymphocytes, Absolute 2.27 0.70 - 3.10 10*3/mm3    Monocytes, Absolute 0.97 (H) 0.10 - 0.90 10*3/mm3    Eosinophils, Absolute 0.43 (H) 0.00 - 0.40 10*3/mm3    Basophils, Absolute 0.13 0.00 - 0.20 10*3/mm3    Immature Grans, Absolute 0.09 (H) 0.00 - 0.05 10*3/mm3    nRBC 0.0 0.0 - 0.2 /100 WBC     Genetic testing was positive for a calreticulin mutation.  EMANUEL 2 and MPL mutation analyses were negative.    Von Willebrand's antigen and activity levels were normal on 5/6/2016.    ASSESSMENT:  This is a 50 y.o. female with:  1.  Essential thrombocythemia with a calreticulin mutation.  Her platelet count is a little higher today at 1.3 million.   She is at low risk for thromboembolic complications.  She will continue aspirin 81 mg  daily.  No other therapy at this time.    2.  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  Varicose veins on the right lower extremity: Continue observation    PLAN:  1.  Continue aspirin 81 mg daily.  2.  Return in about 4 months for follow-up with a CBC.  3.  If her platelet count reaches 1.5 million we will repeat von Willebrand's studies

## 2020-10-20 ENCOUNTER — APPOINTMENT (OUTPATIENT)
Dept: WOMENS IMAGING | Facility: HOSPITAL | Age: 50
End: 2020-10-20

## 2020-10-20 PROCEDURE — 77065 DX MAMMO INCL CAD UNI: CPT | Performed by: RADIOLOGY

## 2020-10-20 PROCEDURE — 76641 ULTRASOUND BREAST COMPLETE: CPT | Performed by: RADIOLOGY

## 2020-11-02 ENCOUNTER — TELEPHONE (OUTPATIENT)
Dept: ONCOLOGY | Facility: CLINIC | Age: 50
End: 2020-11-02

## 2020-11-02 NOTE — TELEPHONE ENCOUNTER
Patient wants to r/s 11/3 appointment.  She needs a Tuesday through Friday appointment.  No Mondays.    [phone]

## 2020-11-03 ENCOUNTER — APPOINTMENT (OUTPATIENT)
Dept: LAB | Facility: HOSPITAL | Age: 50
End: 2020-11-03

## 2020-11-09 ENCOUNTER — TELEPHONE (OUTPATIENT)
Dept: ONCOLOGY | Facility: CLINIC | Age: 50
End: 2020-11-09

## 2020-11-10 ENCOUNTER — APPOINTMENT (OUTPATIENT)
Dept: LAB | Facility: HOSPITAL | Age: 50
End: 2020-11-10

## 2020-11-11 ENCOUNTER — OFFICE VISIT (OUTPATIENT)
Dept: ONCOLOGY | Facility: CLINIC | Age: 50
End: 2020-11-11

## 2020-11-11 ENCOUNTER — LAB (OUTPATIENT)
Dept: LAB | Facility: HOSPITAL | Age: 50
End: 2020-11-11

## 2020-11-11 VITALS
TEMPERATURE: 97.1 F | WEIGHT: 178.1 LBS | HEART RATE: 60 BPM | DIASTOLIC BLOOD PRESSURE: 55 MMHG | SYSTOLIC BLOOD PRESSURE: 118 MMHG | RESPIRATION RATE: 16 BRPM | OXYGEN SATURATION: 99 % | BODY MASS INDEX: 29.67 KG/M2 | HEIGHT: 65 IN

## 2020-11-11 DIAGNOSIS — D47.3 ESSENTIAL THROMBOCYTHEMIA (HCC): ICD-10-CM

## 2020-11-11 DIAGNOSIS — D47.3 ESSENTIAL THROMBOCYTHEMIA (HCC): Primary | ICD-10-CM

## 2020-11-11 LAB
BASOPHILS # BLD AUTO: 0.13 10*3/MM3 (ref 0–0.2)
BASOPHILS NFR BLD AUTO: 1.1 % (ref 0–1.5)
DEPRECATED RDW RBC AUTO: 34.9 FL (ref 37–54)
EOSINOPHIL # BLD AUTO: 0.5 10*3/MM3 (ref 0–0.4)
EOSINOPHIL NFR BLD AUTO: 4.1 % (ref 0.3–6.2)
ERYTHROCYTE [DISTWIDTH] IN BLOOD BY AUTOMATED COUNT: 17.2 % (ref 12.3–15.4)
HCT VFR BLD AUTO: 34.3 % (ref 34–46.6)
HGB BLD-MCNC: 10.8 G/DL (ref 12–15.9)
IMM GRANULOCYTES # BLD AUTO: 0.09 10*3/MM3 (ref 0–0.05)
IMM GRANULOCYTES NFR BLD AUTO: 0.7 % (ref 0–0.5)
LYMPHOCYTES # BLD AUTO: 2.49 10*3/MM3 (ref 0.7–3.1)
LYMPHOCYTES NFR BLD AUTO: 20.6 % (ref 19.6–45.3)
MCH RBC QN AUTO: 19.7 PG (ref 26.6–33)
MCHC RBC AUTO-ENTMCNC: 31.5 G/DL (ref 31.5–35.7)
MCV RBC AUTO: 62.7 FL (ref 79–97)
MONOCYTES # BLD AUTO: 0.73 10*3/MM3 (ref 0.1–0.9)
MONOCYTES NFR BLD AUTO: 6 % (ref 5–12)
NEUTROPHILS NFR BLD AUTO: 67.5 % (ref 42.7–76)
NEUTROPHILS NFR BLD AUTO: 8.14 10*3/MM3 (ref 1.7–7)
NRBC BLD AUTO-RTO: 0 /100 WBC (ref 0–0.2)
PLATELET # BLD AUTO: 1187 10*3/MM3 (ref 140–450)
PMV BLD AUTO: 10.1 FL (ref 6–12)
RBC # BLD AUTO: 5.47 10*6/MM3 (ref 3.77–5.28)
WBC # BLD AUTO: 12.08 10*3/MM3 (ref 3.4–10.8)

## 2020-11-11 PROCEDURE — 36415 COLL VENOUS BLD VENIPUNCTURE: CPT

## 2020-11-11 PROCEDURE — 99213 OFFICE O/P EST LOW 20 MIN: CPT | Performed by: INTERNAL MEDICINE

## 2020-11-11 PROCEDURE — 85025 COMPLETE CBC W/AUTO DIFF WBC: CPT

## 2020-11-11 NOTE — PROGRESS NOTES
"[facility] GROUP OUTPATIENT FOLLOW UP CLINIC VISIT    REASON FOR FOLLOW-UP:    1.  Beta thalassemia minor.  2.  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    HISTORY OF PRESENT ILLNESS:  Mar Hill is a 50 y.o. female who returns today for follow up of the above issue.      She continues aspirin 81 mg daily.  She is doing well.  No bleeding issues.  No evidence for thrombosis.  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    PAST MEDICAL, SURGICAL, FAMILY, AND SOCIAL HISTORIES were reviewed with the patient and in the electronic medical record, and were updated if indicated.    ALLERGIES:  No Known Allergies    MEDICATIONS:  The medication list has been reviewed with the patient by the medical assistant, and the list has been updated in the electronic medical record, which I reviewed.  Medication dosages and frequencies were confirmed to be accurate.    REVIEW OF SYSTEMS:  PAIN:  See Vital Signs below.  GENERAL:  No fevers, chills, night sweats, or unintended weight loss.  SKIN:  No rashes or non-healing lesions.  HEME/LYMPH:  No abnormal bleeding.  No palpable lymphadenopathy.  EYES:  No vision changes or diplopia.  ENT:  No sore throat or difficulty swallowing.   RESPIRATORY:  No cough, shortness of breath, hemoptysis, or wheezing.  CARDIOVASCULAR:  No chest pain, palpitations, orthopnea, or dyspnea on exertion.  GASTROINTESTINAL:  No abdominal pain, nausea, vomiting, constipation, diarrhea, melena, or hematochezia.  GENITOURINARY:  No dysuria or hematuria.  MUSCULOSKELETAL:  No muscle or joint pain.  NEUROLOGIC:  No dizziness, loss of consciousness, or seizures.  PSYCHIATRIC:  No depression, anxiety, or mood changes.  Reviewed 11/11/2020    Vitals:    11/11/20 1525   BP: 118/55   Pulse: 60   Resp: 16   Temp: 97.1 °F (36.2 °C)   TempSrc: Temporal   SpO2: 99%   Weight: 80.8 kg (178 lb 1.6 oz)   Height: 165.5 cm (65.16\") "   PainSc: 0-No pain  Comment: essential thrombocythemia       PHYSICAL EXAMINATION:  General:  No acute distress, awake, alert and oriented  Skin:  Warm and dry, no visible rash  HEENT:  normocephalic/atraumatic.  Wearing a face mask.  Chest:  Normal respiratory effort.  Lungs clear to auscultation bilaterally.  Heart: Regular rate and rhythm without murmurs gallops or rubs  Lymphatics: No cervical or supraclavicular lymphadenopathy  Abdomen: Soft, nontender, nondistended, normal active bowel sounds, no hepatosplenomegaly is palpable  Extremities:  No visible clubbing, cyanosis, or edema.  Some varicose veins present on the right lower extremity below the knee  Neuro/psych:  Grossly non-focal.  Normal mood and affect.        DIAGNOSTIC DATA:  Results for orders placed or performed in visit on 11/11/20   CBC Auto Differential    Specimen: Blood   Result Value Ref Range    WBC 12.08 (H) 3.40 - 10.80 10*3/mm3    RBC 5.47 (H) 3.77 - 5.28 10*6/mm3    Hemoglobin 10.8 (L) 12.0 - 15.9 g/dL    Hematocrit 34.3 34.0 - 46.6 %    MCV 62.7 (L) 79.0 - 97.0 fL    MCH 19.7 (L) 26.6 - 33.0 pg    MCHC 31.5 31.5 - 35.7 g/dL    RDW 17.2 (H) 12.3 - 15.4 %    RDW-SD 34.9 (L) 37.0 - 54.0 fl    MPV 10.1 6.0 - 12.0 fL    Platelets 1,187 (C) 140 - 450 10*3/mm3    Neutrophil % 67.5 42.7 - 76.0 %    Lymphocyte % 20.6 19.6 - 45.3 %    Monocyte % 6.0 5.0 - 12.0 %    Eosinophil % 4.1 0.3 - 6.2 %    Basophil % 1.1 0.0 - 1.5 %    Immature Grans % 0.7 (H) 0.0 - 0.5 %    Neutrophils, Absolute 8.14 (H) 1.70 - 7.00 10*3/mm3    Lymphocytes, Absolute 2.49 0.70 - 3.10 10*3/mm3    Monocytes, Absolute 0.73 0.10 - 0.90 10*3/mm3    Eosinophils, Absolute 0.50 (H) 0.00 - 0.40 10*3/mm3    Basophils, Absolute 0.13 0.00 - 0.20 10*3/mm3    Immature Grans, Absolute 0.09 (H) 0.00 - 0.05 10*3/mm3    nRBC 0.0 0.0 - 0.2 /100 WBC     Genetic testing was positive for a calreticulin mutation.  EMANUEL 2 and MPL mutation analyses were negative.    Von Willebrand's antigen and  activity levels were normal on 5/6/2016.    ASSESSMENT:  This is a 50 y.o. female with:  1.  Essential thrombocythemia with a calreticulin mutation.  Her platelet count is better today at 1.187 million.   She is at low risk for thromboembolic complications.  She will continue aspirin 81 mg daily.  No other therapy at this time.    2.  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  Varicose veins on the right lower extremity: Continue observation    PLAN:  1.  Continue aspirin 81 mg daily.  2.  Return in about 6 months for follow-up with a CBC.  3.  If her platelet count reaches 1.5 million we will repeat von Willebrand's studies and may consider starting therapy

## 2021-01-29 ENCOUNTER — APPOINTMENT (OUTPATIENT)
Dept: WOMENS IMAGING | Facility: HOSPITAL | Age: 51
End: 2021-01-29

## 2021-01-29 PROCEDURE — 77067 SCR MAMMO BI INCL CAD: CPT | Performed by: RADIOLOGY

## 2021-01-29 PROCEDURE — 77063 BREAST TOMOSYNTHESIS BI: CPT | Performed by: RADIOLOGY

## 2021-04-06 ENCOUNTER — BULK ORDERING (OUTPATIENT)
Dept: CASE MANAGEMENT | Facility: OTHER | Age: 51
End: 2021-04-06

## 2021-04-06 DIAGNOSIS — Z23 IMMUNIZATION DUE: ICD-10-CM

## 2021-04-27 NOTE — PROGRESS NOTES
"[facility] GROUP OUTPATIENT FOLLOW UP CLINIC VISIT    REASON FOR FOLLOW-UP:    1.  Beta thalassemia minor.  2.  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    HISTORY OF PRESENT ILLNESS:  Mar Hill is a 51 y.o. female who returns today for follow up of the above issue.      She feels well.  No bleeding.  No signs or symptoms of clotting.  She continues aspirin 81 mg daily.      REVIEW OF SYSTEMS:  PAIN:  See Vital Signs below.  GENERAL:  No fevers, chills, night sweats, or unintended weight loss.  SKIN:  No rashes or non-healing lesions.  HEME/LYMPH:  No abnormal bleeding.  No palpable lymphadenopathy.  EYES:  No vision changes or diplopia.  ENT:  No sore throat or difficulty swallowing.   RESPIRATORY:  No cough, shortness of breath, hemoptysis, or wheezing.  CARDIOVASCULAR:  No chest pain, palpitations, orthopnea, or dyspnea on exertion.  GASTROINTESTINAL:  No abdominal pain, nausea, vomiting, constipation, diarrhea, melena, or hematochezia.  GENITOURINARY:  No dysuria or hematuria.  MUSCULOSKELETAL:  No muscle or joint pain.  NEUROLOGIC:  No dizziness, loss of consciousness, or seizures.  PSYCHIATRIC:  No depression, anxiety, or mood changes.  No changes from prior    Vitals:    04/28/21 1542   BP: 122/80   Pulse: 88   Resp: 16   Temp: 97.1 °F (36.2 °C)   TempSrc: Temporal   SpO2: 97%   Weight: 79.5 kg (175 lb 3.2 oz)   Height: 165.5 cm (65.16\")   PainSc: 0-No pain  Comment: essential thrombocytopenia       PHYSICAL EXAMINATION:  General:  No acute distress, awake, alert and oriented  Skin:  Warm and dry, no visible rash  HEENT:  Normocephalic/atraumatic.  Wearing a facemask.  Chest:  Normal respiratory effort  Extremities:  No visible clubbing, cyanosis, or edema  Neuro/psych:  Grossly non-focal.  Normal mood and affect.            DIAGNOSTIC DATA:  CBC & Differential (04/28/2021 15:48)        ASSESSMENT:  This is a 51 y.o. female with:  *Essential thrombocythemia  · Calreticulin " mutation positive.    · She continues aspirin 81 mg daily  · Platelet count higher today at 1.43 million.  She remains asymptomatic.      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 Continue aspirin 81 mg daily.  2. Because the platelet count has elevated today, check von Willebrand studies and discontinue aspirin if needed  3. No need to start hydroxyurea at this time but if platelet count continues to increase, we will plan to initiate therapy with hydroxyurea  4. Follow-up in about 6 weeks with a CBC

## 2021-04-28 ENCOUNTER — LAB (OUTPATIENT)
Dept: LAB | Facility: HOSPITAL | Age: 51
End: 2021-04-28

## 2021-04-28 ENCOUNTER — OFFICE VISIT (OUTPATIENT)
Dept: ONCOLOGY | Facility: CLINIC | Age: 51
End: 2021-04-28

## 2021-04-28 VITALS
BODY MASS INDEX: 29.19 KG/M2 | OXYGEN SATURATION: 97 % | RESPIRATION RATE: 16 BRPM | HEART RATE: 88 BPM | DIASTOLIC BLOOD PRESSURE: 80 MMHG | TEMPERATURE: 97.1 F | WEIGHT: 175.2 LBS | SYSTOLIC BLOOD PRESSURE: 122 MMHG | HEIGHT: 65 IN

## 2021-04-28 DIAGNOSIS — D47.3 ESSENTIAL THROMBOCYTHEMIA (HCC): Primary | ICD-10-CM

## 2021-04-28 DIAGNOSIS — D47.3 ESSENTIAL THROMBOCYTHEMIA (HCC): ICD-10-CM

## 2021-04-28 LAB
BASOPHILS # BLD AUTO: 0.1 10*3/MM3 (ref 0–0.2)
BASOPHILS NFR BLD AUTO: 0.8 % (ref 0–1.5)
DEPRECATED RDW RBC AUTO: 34.4 FL (ref 37–54)
EOSINOPHIL # BLD AUTO: 0.36 10*3/MM3 (ref 0–0.4)
EOSINOPHIL NFR BLD AUTO: 3 % (ref 0.3–6.2)
ERYTHROCYTE [DISTWIDTH] IN BLOOD BY AUTOMATED COUNT: 17.7 % (ref 12.3–15.4)
HCT VFR BLD AUTO: 34.9 % (ref 34–46.6)
HGB BLD-MCNC: 11 G/DL (ref 12–15.9)
IMM GRANULOCYTES # BLD AUTO: 0.05 10*3/MM3 (ref 0–0.05)
IMM GRANULOCYTES NFR BLD AUTO: 0.4 % (ref 0–0.5)
LYMPHOCYTES # BLD AUTO: 2.51 10*3/MM3 (ref 0.7–3.1)
LYMPHOCYTES NFR BLD AUTO: 20.6 % (ref 19.6–45.3)
MCH RBC QN AUTO: 19.2 PG (ref 26.6–33)
MCHC RBC AUTO-ENTMCNC: 31.5 G/DL (ref 31.5–35.7)
MCV RBC AUTO: 61 FL (ref 79–97)
MONOCYTES # BLD AUTO: 0.89 10*3/MM3 (ref 0.1–0.9)
MONOCYTES NFR BLD AUTO: 7.3 % (ref 5–12)
NEUTROPHILS NFR BLD AUTO: 67.9 % (ref 42.7–76)
NEUTROPHILS NFR BLD AUTO: 8.28 10*3/MM3 (ref 1.7–7)
NRBC BLD AUTO-RTO: 0 /100 WBC (ref 0–0.2)
PLATELET # BLD AUTO: 1430 10*3/MM3 (ref 140–450)
PMV BLD AUTO: 9.9 FL (ref 6–12)
RBC # BLD AUTO: 5.72 10*6/MM3 (ref 3.77–5.28)
WBC # BLD AUTO: 12.19 10*3/MM3 (ref 3.4–10.8)

## 2021-04-28 PROCEDURE — 99214 OFFICE O/P EST MOD 30 MIN: CPT | Performed by: INTERNAL MEDICINE

## 2021-04-28 PROCEDURE — 36415 COLL VENOUS BLD VENIPUNCTURE: CPT

## 2021-04-28 PROCEDURE — 85025 COMPLETE CBC W/AUTO DIFF WBC: CPT

## 2021-04-28 RX ORDER — ECHINACEA PURPUREA EXTRACT 125 MG
1 TABLET ORAL
COMMUNITY

## 2021-04-28 RX ORDER — CETIRIZINE HYDROCHLORIDE 10 MG/1
10 TABLET ORAL DAILY
COMMUNITY
End: 2022-11-01

## 2021-04-30 ENCOUNTER — TELEPHONE (OUTPATIENT)
Dept: ONCOLOGY | Facility: CLINIC | Age: 51
End: 2021-04-30

## 2021-04-30 LAB
FACT VIII ACT/NOR PPP: 116 % (ref 56–140)
PATH INTERP BLD-IMP: NORMAL
VWF AG ACT/NOR PPP IA: 136 % (ref 50–200)
VWF:RCO ACT/NOR PPP PL AGG: 48 % (ref 50–200)

## 2021-04-30 NOTE — TELEPHONE ENCOUNTER
Called pt and informed her of results. She v/u.     ----- Message from Segundo Robledo MD sent at 4/30/2021  7:38 AM EDT -----  Please call the patient regarding her result. Please let her know that her labs show that it is OK for her to continue aspirin. Thanks, MIGUEL

## 2021-06-08 ENCOUNTER — APPOINTMENT (OUTPATIENT)
Dept: LAB | Facility: HOSPITAL | Age: 51
End: 2021-06-08

## 2021-06-09 ENCOUNTER — LAB (OUTPATIENT)
Dept: LAB | Facility: HOSPITAL | Age: 51
End: 2021-06-09

## 2021-06-09 ENCOUNTER — OFFICE VISIT (OUTPATIENT)
Dept: ONCOLOGY | Facility: CLINIC | Age: 51
End: 2021-06-09

## 2021-06-09 VITALS
TEMPERATURE: 97.8 F | HEIGHT: 65 IN | BODY MASS INDEX: 29.72 KG/M2 | DIASTOLIC BLOOD PRESSURE: 69 MMHG | WEIGHT: 178.4 LBS | OXYGEN SATURATION: 99 % | HEART RATE: 79 BPM | SYSTOLIC BLOOD PRESSURE: 140 MMHG | RESPIRATION RATE: 16 BRPM

## 2021-06-09 DIAGNOSIS — D47.3 ESSENTIAL THROMBOCYTHEMIA (HCC): Primary | ICD-10-CM

## 2021-06-09 DIAGNOSIS — D47.3 ESSENTIAL THROMBOCYTHEMIA (HCC): ICD-10-CM

## 2021-06-09 LAB
BASOPHILS # BLD AUTO: 0.1 10*3/MM3 (ref 0–0.2)
BASOPHILS NFR BLD AUTO: 0.7 % (ref 0–1.5)
DEPRECATED RDW RBC AUTO: 34.7 FL (ref 37–54)
EOSINOPHIL # BLD AUTO: 0.26 10*3/MM3 (ref 0–0.4)
EOSINOPHIL NFR BLD AUTO: 1.9 % (ref 0.3–6.2)
ERYTHROCYTE [DISTWIDTH] IN BLOOD BY AUTOMATED COUNT: 17 % (ref 12.3–15.4)
HCT VFR BLD AUTO: 33.1 % (ref 34–46.6)
HGB BLD-MCNC: 10.3 G/DL (ref 12–15.9)
IMM GRANULOCYTES # BLD AUTO: 0.15 10*3/MM3 (ref 0–0.05)
IMM GRANULOCYTES NFR BLD AUTO: 1.1 % (ref 0–0.5)
LYMPHOCYTES # BLD AUTO: 2.15 10*3/MM3 (ref 0.7–3.1)
LYMPHOCYTES NFR BLD AUTO: 15.3 % (ref 19.6–45.3)
MCH RBC QN AUTO: 19.3 PG (ref 26.6–33)
MCHC RBC AUTO-ENTMCNC: 31.1 G/DL (ref 31.5–35.7)
MCV RBC AUTO: 61.9 FL (ref 79–97)
MONOCYTES # BLD AUTO: 0.72 10*3/MM3 (ref 0.1–0.9)
MONOCYTES NFR BLD AUTO: 5.1 % (ref 5–12)
NEUTROPHILS NFR BLD AUTO: 10.64 10*3/MM3 (ref 1.7–7)
NEUTROPHILS NFR BLD AUTO: 75.9 % (ref 42.7–76)
NRBC BLD AUTO-RTO: 0 /100 WBC (ref 0–0.2)
PLATELET # BLD AUTO: 1244 10*3/MM3 (ref 140–450)
PMV BLD AUTO: 10.2 FL (ref 6–12)
RBC # BLD AUTO: 5.35 10*6/MM3 (ref 3.77–5.28)
WBC # BLD AUTO: 14.02 10*3/MM3 (ref 3.4–10.8)

## 2021-06-09 PROCEDURE — 85025 COMPLETE CBC W/AUTO DIFF WBC: CPT

## 2021-06-09 PROCEDURE — 99214 OFFICE O/P EST MOD 30 MIN: CPT | Performed by: INTERNAL MEDICINE

## 2021-06-09 PROCEDURE — 36415 COLL VENOUS BLD VENIPUNCTURE: CPT

## 2021-06-09 RX ORDER — LANSOPRAZOLE 30 MG/1
30 CAPSULE, DELAYED RELEASE ORAL DAILY
COMMUNITY
End: 2021-06-09 | Stop reason: SDDI

## 2021-06-09 NOTE — PROGRESS NOTES
"[facility] GROUP OUTPATIENT FOLLOW UP CLINIC VISIT    REASON FOR FOLLOW-UP:    1.  Beta thalassemia minor.  2.  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    HISTORY OF PRESENT ILLNESS:  Mar Hill is a 51 y.o. female who returns today for follow up of the above issue.      She continues to feel well.  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  She has gained a little weight.  No bleeding.      REVIEW OF SYSTEMS:  PAIN:  See Vital Signs below.  GENERAL:  No fevers, chills, night sweats, or unintended weight loss.  Mild weight gain  SKIN:  No rashes or non-healing lesions.  HEME/LYMPH:  No abnormal bleeding.  No palpable lymphadenopathy.  EYES:  No vision changes or diplopia.  ENT:  No sore throat or difficulty swallowing.   RESPIRATORY:  No cough, shortness of breath, hemoptysis, or wheezing.  CARDIOVASCULAR:  No chest pain, palpitations, orthopnea, or dyspnea on exertion.  GASTROINTESTINAL:  No abdominal pain, nausea, vomiting, constipation, diarrhea, melena, or hematochezia.  GENITOURINARY:  No dysuria or hematuria.  MUSCULOSKELETAL:  No muscle or joint pain.  NEUROLOGIC:  No dizziness, loss of consciousness, or seizures.  PSYCHIATRIC: Some anxiety    Vitals:    06/09/21 1501   BP: 140/69   Pulse: 79   Resp: 16   Temp: 97.8 °F (36.6 °C)   TempSrc: Temporal   SpO2: 99%   Weight: 80.9 kg (178 lb 6.4 oz)   Height: 165.5 cm (65.16\")   PainSc: 0-No pain  Comment: essential thrombocytopenia       PHYSICAL EXAMINATION:  General:  No acute distress, awake, alert and oriented  Skin:  Warm and dry, no visible rash  HEENT:  Normocephalic/atraumatic.  Wearing a facemask.  Chest:  Normal respiratory effort  Extremities:  No visible clubbing, cyanosis, or edema  Neuro/psych:  Grossly non-focal.  Normal mood and affect.  Reviewed 06/09/21    DIAGNOSTIC DATA:  CBC & Differential (06/09/2021 15:09)      ASSESSMENT:  This is a 51 y.o. female " with:  *Essential thrombocythemia  · Calreticulin mutation positive.    · She continues aspirin 81 mg daily  · Platelet count higher at her last visit on 4/28/2021 at 1.43 million.  She remained asymptomatic.  · We did repeat von Willebrand's studies which showed an activity level of 48% and antigen level of 136% and therefore she did continue aspirin at that time.  · Platelets today improved at 1.244 million    *Beta thalassemia trait with mild microcytic anemia:  Chronic and stable.  Hemoglobin a little bit lower today at 10.3.    *Mild leukocytosis with neutrophilia likely related to myeloproliferative disorder      PLAN:  1. Continue aspirin 81 mg daily.  2. Follow-up in 3 to 4 months with a CBC.  No need to start therapy at this time.  3. 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  She has been working from home and is very reluctant to return to the office.  Outside of her childcare issues, I advised her that there should be safe for her to go back to work.  If she has any direct interactions with other people while at the office she and the other person will mask and I think this is very appropriate.    I spent 30 minutes in this visit today communicating with her, reviewing her record, documenting the encounter, placing orders.

## 2021-09-17 ENCOUNTER — TELEPHONE (OUTPATIENT)
Dept: ONCOLOGY | Facility: CLINIC | Age: 51
End: 2021-09-17

## 2021-09-17 NOTE — TELEPHONE ENCOUNTER
Caller: Mar Hill    Relationship to patient: Self    Best call back number: [phone]    Chief complaint: PT WANTS TO RESCHEDULE LAB    Type of visit: LAB    Requested date: 09/20 NOON     If rescheduling, when is the original appointment: 09/22    Additional notes:

## 2021-09-19 NOTE — PROGRESS NOTES
[facility] CBC GROUP OUTPATIENT FOLLOW UP VISIT    REASON FOR FOLLOW-UP:    1.  Beta thalassemia minor.  2.  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    HISTORY OF PRESENT ILLNESS:  Mar Hill is a 51 y.o. female who returns today for follow up of the above issue.      Video visit today due to the COVID-19 pandemic.  She also recently started a new job.    She feels very well.  No bleeding problems.  She continues aspirin which she tolerates well.      REVIEW OF SYSTEMS:  As per the [facility]    There were no vitals filed for this visit.    PHYSICAL EXAMINATION:  Video visit today  General:  No acute distress, awake, alert and oriented  Skin:  Warm and dry, no visible rash  HEENT:  Normocephalic/atraumatic.  Wearing a facemask.  Chest:  Normal respiratory effort  Extremities:  No visible clubbing, cyanosis, or edema  Neuro/psych:  Grossly non-focal.  Normal mood and affect.      DIAGNOSTIC DATA:  CBC & Differential (09/20/2021 12:05)      ASSESSMENT:  This is a 51 y.o. female with:  *Essential thrombocythemia  · Calreticulin mutation positive.    · She continues aspirin 81 mg daily  · I recently repeated von Willebrand's studies which showed an activity level of 48% and antigen level of 136% and therefore she did continue aspirin at that time.  · Platelets remain elevated at 1.488 million today    *Beta thalassemia trait with mild microcytic anemia:  Chronic and stable.  Hemoglobin a little bit higher today at 11.5    *Mild leukocytosis with neutrophilia likely related to myeloproliferative disorder.  White blood cell count 13.68.  Continue monitoring.      PLAN:   1. Continue aspirin 81 mg daily.  2. Follow-up in January with a CBC.    3. No need to start cytoreductive therapy at this time.  4. If her platelet count remains this elevated, intermittent von Willebrand's studies    You have chosen to receive care through a telehealth visit.  Do you consent to use a video/audio connection for your  medical care today? Yes

## 2021-09-20 ENCOUNTER — APPOINTMENT (OUTPATIENT)
Dept: LAB | Facility: HOSPITAL | Age: 51
End: 2021-09-20

## 2021-09-20 ENCOUNTER — LAB (OUTPATIENT)
Dept: LAB | Facility: HOSPITAL | Age: 51
End: 2021-09-20

## 2021-09-20 ENCOUNTER — TELEMEDICINE (OUTPATIENT)
Dept: ONCOLOGY | Facility: CLINIC | Age: 51
End: 2021-09-20

## 2021-09-20 DIAGNOSIS — D47.3 ESSENTIAL THROMBOCYTHEMIA (HCC): ICD-10-CM

## 2021-09-20 DIAGNOSIS — D47.3 ESSENTIAL THROMBOCYTHEMIA (HCC): Primary | ICD-10-CM

## 2021-09-20 LAB
BASOPHILS # BLD AUTO: 0.13 10*3/MM3 (ref 0–0.2)
BASOPHILS NFR BLD AUTO: 1 % (ref 0–1.5)
DEPRECATED RDW RBC AUTO: 34.6 FL (ref 37–54)
EOSINOPHIL # BLD AUTO: 0.46 10*3/MM3 (ref 0–0.4)
EOSINOPHIL NFR BLD AUTO: 3.4 % (ref 0.3–6.2)
ERYTHROCYTE [DISTWIDTH] IN BLOOD BY AUTOMATED COUNT: 17.4 % (ref 12.3–15.4)
HCT VFR BLD AUTO: 36.1 % (ref 34–46.6)
HGB BLD-MCNC: 11.5 G/DL (ref 12–15.9)
IMM GRANULOCYTES # BLD AUTO: 0.09 10*3/MM3 (ref 0–0.05)
IMM GRANULOCYTES NFR BLD AUTO: 0.7 % (ref 0–0.5)
LYMPHOCYTES # BLD AUTO: 2.42 10*3/MM3 (ref 0.7–3.1)
LYMPHOCYTES NFR BLD AUTO: 17.7 % (ref 19.6–45.3)
MCH RBC QN AUTO: 19.9 PG (ref 26.6–33)
MCHC RBC AUTO-ENTMCNC: 31.9 G/DL (ref 31.5–35.7)
MCV RBC AUTO: 62.3 FL (ref 79–97)
MONOCYTES # BLD AUTO: 0.83 10*3/MM3 (ref 0.1–0.9)
MONOCYTES NFR BLD AUTO: 6.1 % (ref 5–12)
NEUTROPHILS NFR BLD AUTO: 71.1 % (ref 42.7–76)
NEUTROPHILS NFR BLD AUTO: 9.75 10*3/MM3 (ref 1.7–7)
NRBC BLD AUTO-RTO: 0.1 /100 WBC (ref 0–0.2)
PLATELET # BLD AUTO: 1488 10*3/MM3 (ref 140–450)
PMV BLD AUTO: 10.1 FL (ref 6–12)
RBC # BLD AUTO: 5.79 10*6/MM3 (ref 3.77–5.28)
WBC # BLD AUTO: 13.68 10*3/MM3 (ref 3.4–10.8)

## 2021-09-20 PROCEDURE — 99213 OFFICE O/P EST LOW 20 MIN: CPT | Performed by: INTERNAL MEDICINE

## 2021-09-20 PROCEDURE — 85025 COMPLETE CBC W/AUTO DIFF WBC: CPT

## 2021-09-20 PROCEDURE — 36415 COLL VENOUS BLD VENIPUNCTURE: CPT

## 2021-09-22 ENCOUNTER — APPOINTMENT (OUTPATIENT)
Dept: LAB | Facility: HOSPITAL | Age: 51
End: 2021-09-22

## 2021-11-20 ENCOUNTER — IMMUNIZATION (OUTPATIENT)
Dept: VACCINE CLINIC | Facility: HOSPITAL | Age: 51
End: 2021-11-20

## 2021-11-20 PROCEDURE — 0004A ADM SARSCOV2 30MCG/0.3ML BOOSTER: CPT | Performed by: INTERNAL MEDICINE

## 2021-11-20 PROCEDURE — 91300 HC SARSCOV02 VAC 30MCG/0.3ML IM: CPT | Performed by: INTERNAL MEDICINE

## 2022-01-16 NOTE — PROGRESS NOTES
"[facility] CBC GROUP OUTPATIENT FOLLOW UP VISIT    REASON FOR FOLLOW-UP:    1.  Beta thalassemia minor.  2.  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    HISTORY OF PRESENT ILLNESS:  Mar Hill is a 51 y.o. female who returns today for follow up of the above issue.      She feels well.  She denies any new issues today.  No bleeding.  No fevers chills or infectious symptoms.      REVIEW OF SYSTEMS:  As per the HPI    Vitals:    01/17/22 1502   BP: 132/74   Pulse: 84   Resp: 16   Temp: 97.5 °F (36.4 °C)   TempSrc: Temporal   SpO2: 100%   Weight: 83.6 kg (184 lb 6.4 oz)   Height: 165.5 cm (65.16\")   PainSc: 0-No pain       PHYSICAL EXAMINATION:  General:  No acute distress, awake, alert and oriented  Skin:  Warm and dry, no visible rash  HEENT:  Normocephalic/atraumatic.  Wearing a facemask.  Chest:  Normal respiratory effort  Extremities:  No visible clubbing, cyanosis, or edema  Neuro/psych:  Grossly non-focal.  Normal mood and affect.    DIAGNOSTIC DATA:  CBC & Differential (01/17/2022 14:47)      ASSESSMENT:  This is a 51 y.o. female with:    *Essential thrombocythemia  · Calreticulin mutation positive.    · She continues aspirin 81 mg daily  · I recently repeated von Willebrand's studies which showed an activity level of 48% and antigen level of 136% and therefore she did continue aspirin at that time.  · Platelets today improved at 1.301 million    *Beta thalassemia trait with mild microcytic anemia:  Chronic and stable.  Hemoglobin today a little bit lower at 9.9    *Mild leukocytosis with neutrophilia likely related to myeloproliferative disorder.  White blood cell count reasonably stable at 12.61      PLAN:   1. Continue aspirin 81 mg daily.  2. Follow-up in about 3 months with a CBC and reticulocyte count  3. Again, there is no need to start cytoreductive therapy at this time.  4. Repeat von Willebrand studies as needed    Orders Placed This Encounter   Procedures   • Retic With IRF " & RET-He     Standing Status:   Future     Standing Expiration Date:   1/18/2023     Order Specific Question:   Release to patient     Answer:   Immediate   • CBC & Differential     Standing Status:   Future     Standing Expiration Date:   1/17/2023     Order Specific Question:   Manual Differential     Answer:   No

## 2022-01-17 ENCOUNTER — LAB (OUTPATIENT)
Dept: LAB | Facility: HOSPITAL | Age: 52
End: 2022-01-17

## 2022-01-17 ENCOUNTER — OFFICE VISIT (OUTPATIENT)
Dept: ONCOLOGY | Facility: CLINIC | Age: 52
End: 2022-01-17

## 2022-01-17 VITALS
OXYGEN SATURATION: 100 % | BODY MASS INDEX: 30.72 KG/M2 | TEMPERATURE: 97.5 F | HEART RATE: 84 BPM | RESPIRATION RATE: 16 BRPM | DIASTOLIC BLOOD PRESSURE: 74 MMHG | SYSTOLIC BLOOD PRESSURE: 132 MMHG | WEIGHT: 184.4 LBS | HEIGHT: 65 IN

## 2022-01-17 DIAGNOSIS — D56.3 BETA THALASSEMIA MINOR: ICD-10-CM

## 2022-01-17 DIAGNOSIS — D47.3 ESSENTIAL THROMBOCYTHEMIA: ICD-10-CM

## 2022-01-17 DIAGNOSIS — D47.3 ESSENTIAL THROMBOCYTHEMIA: Primary | ICD-10-CM

## 2022-01-17 LAB
BASOPHILS # BLD AUTO: 0.12 10*3/MM3 (ref 0–0.2)
BASOPHILS NFR BLD AUTO: 1 % (ref 0–1.5)
DEPRECATED RDW RBC AUTO: 33.4 FL (ref 37–54)
EOSINOPHIL # BLD AUTO: 0.94 10*3/MM3 (ref 0–0.4)
EOSINOPHIL NFR BLD AUTO: 7.5 % (ref 0.3–6.2)
ERYTHROCYTE [DISTWIDTH] IN BLOOD BY AUTOMATED COUNT: 16.4 % (ref 12.3–15.4)
HCT VFR BLD AUTO: 30.9 % (ref 34–46.6)
HGB BLD-MCNC: 9.9 G/DL (ref 12–15.9)
IMM GRANULOCYTES # BLD AUTO: 0.09 10*3/MM3 (ref 0–0.05)
IMM GRANULOCYTES NFR BLD AUTO: 0.7 % (ref 0–0.5)
LYMPHOCYTES # BLD AUTO: 2.43 10*3/MM3 (ref 0.7–3.1)
LYMPHOCYTES NFR BLD AUTO: 19.3 % (ref 19.6–45.3)
MCH RBC QN AUTO: 19.6 PG (ref 26.6–33)
MCHC RBC AUTO-ENTMCNC: 32 G/DL (ref 31.5–35.7)
MCV RBC AUTO: 61.1 FL (ref 79–97)
MONOCYTES # BLD AUTO: 0.81 10*3/MM3 (ref 0.1–0.9)
MONOCYTES NFR BLD AUTO: 6.4 % (ref 5–12)
NEUTROPHILS NFR BLD AUTO: 65.1 % (ref 42.7–76)
NEUTROPHILS NFR BLD AUTO: 8.22 10*3/MM3 (ref 1.7–7)
NRBC BLD AUTO-RTO: 0 /100 WBC (ref 0–0.2)
PLATELET # BLD AUTO: 1301 10*3/MM3 (ref 140–450)
PMV BLD AUTO: 9.7 FL (ref 6–12)
RBC # BLD AUTO: 5.06 10*6/MM3 (ref 3.77–5.28)
WBC NRBC COR # BLD: 12.61 10*3/MM3 (ref 3.4–10.8)

## 2022-01-17 PROCEDURE — 99214 OFFICE O/P EST MOD 30 MIN: CPT | Performed by: INTERNAL MEDICINE

## 2022-01-17 PROCEDURE — 85025 COMPLETE CBC W/AUTO DIFF WBC: CPT

## 2022-01-17 PROCEDURE — 36415 COLL VENOUS BLD VENIPUNCTURE: CPT

## 2022-02-25 ENCOUNTER — APPOINTMENT (OUTPATIENT)
Dept: WOMENS IMAGING | Facility: HOSPITAL | Age: 52
End: 2022-02-25

## 2022-02-25 PROCEDURE — 77067 SCR MAMMO BI INCL CAD: CPT | Performed by: RADIOLOGY

## 2022-02-25 PROCEDURE — 77063 BREAST TOMOSYNTHESIS BI: CPT | Performed by: RADIOLOGY

## 2022-04-18 ENCOUNTER — APPOINTMENT (OUTPATIENT)
Dept: LAB | Facility: HOSPITAL | Age: 52
End: 2022-04-18

## 2022-04-19 ENCOUNTER — TELEPHONE (OUTPATIENT)
Dept: ONCOLOGY | Facility: CLINIC | Age: 52
End: 2022-04-19

## 2022-04-19 NOTE — TELEPHONE ENCOUNTER
Caller: Mar Hill    Relationship to patient: Self    Best call back number: [phone]    PATIENT TO RESCHED FROM 4/18/22     Type of visit: LAB AN FU    Requested date: MORNING OF 5/6, MORNING OF 5/20, MONDAYS ALSO DO WELL     If rescheduling, when is the original appointment

## 2022-05-06 ENCOUNTER — LAB (OUTPATIENT)
Dept: OTHER | Facility: HOSPITAL | Age: 52
End: 2022-05-06

## 2022-05-06 ENCOUNTER — OFFICE VISIT (OUTPATIENT)
Dept: ONCOLOGY | Facility: CLINIC | Age: 52
End: 2022-05-06

## 2022-05-06 ENCOUNTER — TELEPHONE (OUTPATIENT)
Dept: ONCOLOGY | Facility: CLINIC | Age: 52
End: 2022-05-06

## 2022-05-06 VITALS
HEIGHT: 65 IN | BODY MASS INDEX: 30.09 KG/M2 | WEIGHT: 180.6 LBS | OXYGEN SATURATION: 97 % | TEMPERATURE: 97.1 F | SYSTOLIC BLOOD PRESSURE: 138 MMHG | RESPIRATION RATE: 16 BRPM | DIASTOLIC BLOOD PRESSURE: 83 MMHG | HEART RATE: 75 BPM

## 2022-05-06 DIAGNOSIS — D47.3 ESSENTIAL THROMBOCYTHEMIA: Primary | ICD-10-CM

## 2022-05-06 DIAGNOSIS — D56.3 BETA THALASSEMIA MINOR: ICD-10-CM

## 2022-05-06 DIAGNOSIS — D47.3 ESSENTIAL THROMBOCYTHEMIA: ICD-10-CM

## 2022-05-06 LAB
ALBUMIN SERPL-MCNC: 4.2 G/DL (ref 3.5–5.2)
ALBUMIN/GLOB SERPL: 1.6 G/DL
ALP SERPL-CCNC: 56 U/L (ref 39–117)
ALT SERPL W P-5'-P-CCNC: 9 U/L (ref 1–33)
ANION GAP SERPL CALCULATED.3IONS-SCNC: 8.2 MMOL/L (ref 5–15)
AST SERPL-CCNC: 15 U/L (ref 1–32)
BASOPHILS # BLD AUTO: 0.1 10*3/MM3 (ref 0–0.2)
BASOPHILS NFR BLD AUTO: 1.2 % (ref 0–1.5)
BILIRUB SERPL-MCNC: 0.5 MG/DL (ref 0–1.2)
BUN SERPL-MCNC: 11 MG/DL (ref 6–20)
BUN/CREAT SERPL: 17.2 (ref 7–25)
CALCIUM SPEC-SCNC: 9.4 MG/DL (ref 8.6–10.5)
CHLORIDE SERPL-SCNC: 106 MMOL/L (ref 98–107)
CO2 SERPL-SCNC: 26.8 MMOL/L (ref 22–29)
CREAT SERPL-MCNC: 0.64 MG/DL (ref 0.57–1)
DEPRECATED RDW RBC AUTO: 33.2 FL (ref 37–54)
EGFRCR SERPLBLD CKD-EPI 2021: 107.8 ML/MIN/1.73
EOSINOPHIL # BLD AUTO: 0.65 10*3/MM3 (ref 0–0.4)
EOSINOPHIL NFR BLD AUTO: 7.6 % (ref 0.3–6.2)
ERYTHROCYTE [DISTWIDTH] IN BLOOD BY AUTOMATED COUNT: 16 % (ref 12.3–15.4)
FERRITIN SERPL-MCNC: 10.9 NG/ML (ref 13–150)
FOLATE SERPL-MCNC: 6.19 NG/ML (ref 4.78–24.2)
GLOBULIN UR ELPH-MCNC: 2.7 GM/DL
GLUCOSE SERPL-MCNC: 93 MG/DL (ref 65–99)
HCT VFR BLD AUTO: 30.9 % (ref 34–46.6)
HGB BLD-MCNC: 9.4 G/DL (ref 12–15.9)
HGB RETIC QN AUTO: 20.3 PG (ref 29.8–36.1)
HYPOCHROMIA BLD QL: NORMAL
IMM GRANULOCYTES # BLD AUTO: 0.04 10*3/MM3 (ref 0–0.05)
IMM GRANULOCYTES NFR BLD AUTO: 0.5 % (ref 0–0.5)
IMM RETICS NFR: 18.6 % (ref 3–15.8)
IRON 24H UR-MRATE: 34 MCG/DL (ref 37–145)
IRON SATN MFR SERPL: 8 % (ref 20–50)
LDH SERPL-CCNC: 240 U/L (ref 135–214)
LYMPHOCYTES # BLD AUTO: 1.6 10*3/MM3 (ref 0.7–3.1)
LYMPHOCYTES NFR BLD AUTO: 18.7 % (ref 19.6–45.3)
MCH RBC QN AUTO: 18.3 PG (ref 26.6–33)
MCHC RBC AUTO-ENTMCNC: 30.4 G/DL (ref 31.5–35.7)
MCV RBC AUTO: 60.1 FL (ref 79–97)
MONOCYTES # BLD AUTO: 0.54 10*3/MM3 (ref 0.1–0.9)
MONOCYTES NFR BLD AUTO: 6.3 % (ref 5–12)
NEUTROPHILS NFR BLD AUTO: 5.62 10*3/MM3 (ref 1.7–7)
NEUTROPHILS NFR BLD AUTO: 65.7 % (ref 42.7–76)
NRBC BLD AUTO-RTO: 0 /100 WBC (ref 0–0.2)
PLAT MORPH BLD: NORMAL
PLATELET # BLD AUTO: 1031 10*3/MM3 (ref 140–450)
PMV BLD AUTO: 10 FL (ref 6–12)
POTASSIUM SERPL-SCNC: 3.9 MMOL/L (ref 3.5–5.2)
PROT SERPL-MCNC: 6.9 G/DL (ref 6–8.5)
RBC # BLD AUTO: 5.14 10*6/MM3 (ref 3.77–5.28)
RETICS # AUTO: 0.1 10*6/MM3 (ref 0.02–0.13)
RETICS/RBC NFR AUTO: 1.86 % (ref 0.7–1.9)
SODIUM SERPL-SCNC: 141 MMOL/L (ref 136–145)
TARGETS BLD QL SMEAR: NORMAL
TIBC SERPL-MCNC: 437 MCG/DL (ref 298–536)
TRANSFERRIN SERPL-MCNC: 293 MG/DL (ref 200–360)
VIT B12 BLD-MCNC: 543 PG/ML (ref 211–946)
WBC MORPH BLD: NORMAL
WBC NRBC COR # BLD: 8.55 10*3/MM3 (ref 3.4–10.8)

## 2022-05-06 PROCEDURE — 82746 ASSAY OF FOLIC ACID SERUM: CPT | Performed by: INTERNAL MEDICINE

## 2022-05-06 PROCEDURE — 82747 ASSAY OF FOLIC ACID RBC: CPT | Performed by: INTERNAL MEDICINE

## 2022-05-06 PROCEDURE — 36415 COLL VENOUS BLD VENIPUNCTURE: CPT

## 2022-05-06 PROCEDURE — 82607 VITAMIN B-12: CPT | Performed by: INTERNAL MEDICINE

## 2022-05-06 PROCEDURE — 99214 OFFICE O/P EST MOD 30 MIN: CPT | Performed by: INTERNAL MEDICINE

## 2022-05-06 PROCEDURE — 83615 LACTATE (LD) (LDH) ENZYME: CPT | Performed by: INTERNAL MEDICINE

## 2022-05-06 PROCEDURE — 85014 HEMATOCRIT: CPT | Performed by: INTERNAL MEDICINE

## 2022-05-06 PROCEDURE — 80053 COMPREHEN METABOLIC PANEL: CPT | Performed by: INTERNAL MEDICINE

## 2022-05-06 PROCEDURE — 85007 BL SMEAR W/DIFF WBC COUNT: CPT | Performed by: INTERNAL MEDICINE

## 2022-05-06 PROCEDURE — 84466 ASSAY OF TRANSFERRIN: CPT | Performed by: INTERNAL MEDICINE

## 2022-05-06 PROCEDURE — 82728 ASSAY OF FERRITIN: CPT | Performed by: INTERNAL MEDICINE

## 2022-05-06 PROCEDURE — 85025 COMPLETE CBC W/AUTO DIFF WBC: CPT | Performed by: INTERNAL MEDICINE

## 2022-05-06 PROCEDURE — 85046 RETICYTE/HGB CONCENTRATE: CPT | Performed by: INTERNAL MEDICINE

## 2022-05-06 PROCEDURE — 83540 ASSAY OF IRON: CPT | Performed by: INTERNAL MEDICINE

## 2022-05-06 RX ORDER — DOXYCYCLINE HYCLATE 50 MG/1
324 CAPSULE, GELATIN COATED ORAL
Qty: 60 TABLET | Refills: 5 | Status: SHIPPED | OUTPATIENT
Start: 2022-05-06 | End: 2022-11-02

## 2022-05-06 RX ORDER — OMEPRAZOLE 40 MG/1
40 CAPSULE, DELAYED RELEASE ORAL DAILY
Start: 2022-05-06 | End: 2022-08-26

## 2022-05-06 RX ORDER — OMEPRAZOLE 20 MG/1
CAPSULE, DELAYED RELEASE ORAL
COMMUNITY
Start: 2022-03-01 | End: 2022-05-06 | Stop reason: DRUGHIGH

## 2022-05-06 RX ORDER — VALACYCLOVIR HYDROCHLORIDE 500 MG/1
1 TABLET, FILM COATED ORAL 2 TIMES DAILY
COMMUNITY

## 2022-05-06 RX ORDER — ALUMINUM ZIRCONIUM OCTACHLOROHYDREX GLY 16 G/100G
GEL TOPICAL DAILY
COMMUNITY
Start: 2021-12-17 | End: 2022-11-01

## 2022-05-06 RX ORDER — OMEPRAZOLE 40 MG/1
CAPSULE, DELAYED RELEASE ORAL
COMMUNITY
Start: 2022-04-22 | End: 2022-05-06 | Stop reason: SDDI

## 2022-05-06 NOTE — PROGRESS NOTES
She's iron deficient. EGD and colonoscopy are already planned independent of this. Can you communicate with her to make sure she's not bleeding anywhere? Please let her know I'm starting ferrous gluconate twice daily. I'll send the rx. Thanks, MIGUEL

## 2022-05-06 NOTE — TELEPHONE ENCOUNTER
----- Message from Segundo Robledo MD sent at 5/6/2022  2:03 PM EDT -----  She's iron deficient. EGD and colonoscopy are already planned independent of this. Can you communicate with her to make sure she's not bleeding anywhere? Please let her know I'm starting ferrous gluconate twice daily. I'll send the rx. Thanks, MIGUEL

## 2022-05-06 NOTE — PROGRESS NOTES
"[facility] CBC GROUP OUTPATIENT FOLLOW UP VISIT    REASON FOR FOLLOW-UP:    1.  Beta thalassemia minor.  2.  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    HISTORY OF PRESENT ILLNESS:  Mar Hill is a 52 y.o. female who returns today for follow up of the above issue.     Heartburn has been a little bit worse lately.  She is now on omeprazole with some improvement.  She denies any bleeding.  An EGD and colonoscopy are planned due to heartburn and because she is due for a colonoscopy.      REVIEW OF SYSTEMS:  As per the [facility]    Vitals:    05/06/22 0754   BP: 138/83   Pulse: 75   Resp: 16   Temp: 97.1 °F (36.2 °C)   TempSrc: Temporal   SpO2: 97%   Weight: 81.9 kg (180 lb 9.6 oz)   Height: 165.5 cm (65.16\")   PainSc: 0-No pain  Comment: Essential thrombocytopenia       PHYSICAL EXAMINATION:  General:  No acute distress, awake, alert and oriented  Skin:  Warm and dry, no visible rash  HEENT:  Normocephalic/atraumatic.  Wearing a face mask.  Chest:  Normal respiratory effort  Extremities:  No visible clubbing, cyanosis, or edema  Neuro/psych:  Grossly nonfocal.  Normal mood and affect.        DIAGNOSTIC DATA:  CBC & Differential (05/06/2022 08:01)  Retic With IRF & RET-He (05/06/2022 08:01)      ASSESSMENT:  This is a 52 y.o. female with:    *Essential thrombocythemia  · Calreticulin mutation positive.    · She continues aspirin 81 mg daily  · I recently repeated von Willebrand's studies which showed an activity level of 48% and antigen level of 136% and therefore she did continue aspirin at that time.  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  Hemoglobin today is lower at 9.4 for unclear reason.    *Mild leukocytosis with neutrophilia likely related to myeloproliferative disorder.  White blood cell count normal today      PLAN:   1. Continue aspirin 81 mg daily.  2. Laboratory anemia evaluation today since her " hemoglobin is a little bit lower  3. She states an EGD and colonoscopy are planned  4. Follow-up in about 3 months with a CBC and reticulocyte count  5. No need to treat the ET at this time  6. Repeat von Willebrand studies as needed    Orders Placed This Encounter   Procedures   • Retic With IRF & RET-He     Standing Status:   Future     Standing Expiration Date:   5/7/2023     Order Specific Question:   Release to patient     Answer:   Immediate   • Comprehensive Metabolic Panel     Order Specific Question:   Release to patient     Answer:   Immediate   • Ferritin     Order Specific Question:   Is the patient on iron therapy?     Answer:   No     Order Specific Question:   Release to patient     Answer:   Immediate   • Iron Profile     Order Specific Question:   Is the patient on iron therapy?     Answer:   No     Order Specific Question:   Release to patient     Answer:   Immediate   • Vitamin B12     Order Specific Question:   Release to patient     Answer:   Immediate   • Folate RBC     Order Specific Question:   Release to patient     Answer:   Immediate   • Folate     Order Specific Question:   Release to patient     Answer:   Immediate   • Lactate Dehydrogenase     Order Specific Question:   Release to patient     Answer:   Immediate   • CBC & Differential     Standing Status:   Future     Standing Expiration Date:   5/6/2023     Order Specific Question:   Manual Differential     Answer:   No

## 2022-05-06 NOTE — TELEPHONE ENCOUNTER
LVM for pt letting her know she is iron deficient and Dr. Robledo is sending in a script for ferrous gluconate to be taken twice daily. I asked her to return my call to see if she is having any type of bleeding, urine, stool, nose or menstrual cycle. Phone number provided for return call.       Pt returned called. Reviewed all above with pt. She states she is currently on her menstrual cycle day 4. She states she bled through her clothes on Wednesday cycle day 2 (heavy). Pt states she does not have her GI consult until August. Her EDG and colonoscopy. Dr. Robledo informed. Pt will puck up prescription. We reviewed the protocol for constipation. I advised her to call if the has any issues or intolerance to the PO iron. Pt V/U.

## 2022-05-08 LAB
FOLATE BLD-MCNC: 327 NG/ML
FOLATE RBC-MCNC: 982 NG/ML
HCT VFR BLD AUTO: 33.3 % (ref 34–46.6)

## 2022-07-25 ENCOUNTER — TELEPHONE (OUTPATIENT)
Dept: ONCOLOGY | Facility: CLINIC | Age: 52
End: 2022-07-25

## 2022-07-25 NOTE — TELEPHONE ENCOUNTER
Caller: CAROLYN    Relationship to patient: SELF    Best call back number: [phone]    Patient is needing: TO R/S 8-1-2022 LAB AND F/U MILA TO 8-.

## 2022-07-25 NOTE — TELEPHONE ENCOUNTER
Received phone message to reschedule patients follow up appt. Spoke with pt she is ok with going to EP. Patient confirmed new date and time for appt per her availability.

## 2022-08-01 ENCOUNTER — APPOINTMENT (OUTPATIENT)
Dept: LAB | Facility: HOSPITAL | Age: 52
End: 2022-08-01

## 2022-08-12 ENCOUNTER — APPOINTMENT (OUTPATIENT)
Dept: OTHER | Facility: HOSPITAL | Age: 52
End: 2022-08-12

## 2022-08-24 NOTE — PROGRESS NOTES
"[facility] CBC GROUP OUTPATIENT FOLLOW UP VISIT    REASON FOR FOLLOW-UP:    1.  Beta thalassemia minor.  2.  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    HISTORY OF PRESENT ILLNESS:  Mar Hill is a 52 y.o. female who returns today for follow up of the above issue.     She feels well.  Recently her menstrual periods have been much heavier.  She is taking an iron supplement typically once daily but sometimes twice daily when she remembers.    REVIEW OF SYSTEMS:  As per the [facility]    Vitals:    08/26/22 1555   BP: 121/72   Pulse: 79   Resp: 18   Temp: 97.3 °F (36.3 °C)   TempSrc: Temporal   SpO2: 96%   Weight: 81.9 kg (180 lb 8 oz)   Height: 165.5 cm (65.16\")   PainSc: 0-No pain       PHYSICAL EXAMINATION:  General:  No acute distress, awake, alert and oriented  Skin:  Warm and dry, no visible rash  HEENT:  Normocephalic/atraumatic.  Wearing a face mask.  Chest:  Normal respiratory effort  Extremities:  No visible clubbing, cyanosis, or edema  Neuro/psych:  Grossly nonfocal.  Normal mood and affect.          DIAGNOSTIC DATA:  Retic With IRF & RET-He (08/26/2022 15:35)  CBC & Differential (08/26/2022 15:35)  Ferritin (08/26/2022 15:35)  Iron Profile (08/26/2022 15:35)      ASSESSMENT:  This is a 52 y.o. female with:    *Essential thrombocythemia  · Calreticulin mutation positive.    · She continues aspirin 81 mg daily  · I recently repeated von Willebrand's studies which showed an activity level of 48% and antigen level of 136% and therefore she did continue aspirin at that time.  · 8/26/2022: Platelets stable.    *Beta thalassemia trait typically with mild microcytic anemia, worsening related to iron deficiency likely related to menorrhagia:   · Hemoglobin was lower on 5/6/2022 and she was iron deficient at that time, hemoglobin 9.4, ferritin 10.9, iron 34 with a percent iron saturation  · EGD and colonoscopy on 7/15/2022 were unremarkable  · She was started on ferrous gluconate  · 8/26/2022: " Hemoglobin slightly improved at 9.8, MCV 63.1, ferritin 42.1, iron 63 with 19% iron saturation  · Menorrhagia is likely contributing    *Mild leukocytosis with neutrophilia likely related to myeloproliferative disorder.  White blood cell count remains normal today      PLAN:   1. Continue aspirin 81 mg daily.  2. Follow-up with gynecology if needed regarding menorrhagia  3. Continue iron twice daily  4. She will continue her PPI  5. No need for treatment of essential thrombocythemia at this time  6. Repeat von Willebrand studies if needed, may be needed if menorrhagia does not improve but she never had an issue with von Willebrand's before and her platelets have been stable for some time  7. Follow-up in a few months with labs    Orders Placed This Encounter   Procedures   • Ferritin     Standing Status:   Future     Standing Expiration Date:   8/27/2023   • Iron Profile     Standing Status:   Future     Standing Expiration Date:   8/27/2023   • Retic With IRF & RET-He     Standing Status:   Future     Standing Expiration Date:   8/27/2023     Order Specific Question:   Release to patient     Answer:   Routine Release   • CBC & Differential     Standing Status:   Future     Standing Expiration Date:   8/26/2023     Order Specific Question:   Manual Differential     Answer:   No

## 2022-08-26 ENCOUNTER — OFFICE VISIT (OUTPATIENT)
Dept: ONCOLOGY | Facility: CLINIC | Age: 52
End: 2022-08-26

## 2022-08-26 ENCOUNTER — LAB (OUTPATIENT)
Dept: OTHER | Facility: HOSPITAL | Age: 52
End: 2022-08-26

## 2022-08-26 VITALS
TEMPERATURE: 97.3 F | RESPIRATION RATE: 18 BRPM | BODY MASS INDEX: 30.07 KG/M2 | OXYGEN SATURATION: 96 % | HEART RATE: 79 BPM | WEIGHT: 180.5 LBS | SYSTOLIC BLOOD PRESSURE: 121 MMHG | HEIGHT: 65 IN | DIASTOLIC BLOOD PRESSURE: 72 MMHG

## 2022-08-26 DIAGNOSIS — D47.3 ESSENTIAL THROMBOCYTHEMIA: Primary | ICD-10-CM

## 2022-08-26 DIAGNOSIS — D56.3 BETA THALASSEMIA MINOR: ICD-10-CM

## 2022-08-26 DIAGNOSIS — D47.3 ESSENTIAL THROMBOCYTHEMIA: ICD-10-CM

## 2022-08-26 LAB
ANISOCYTOSIS BLD QL: NORMAL
BASOPHILS # BLD AUTO: 0.1 10*3/MM3 (ref 0–0.2)
BASOPHILS NFR BLD AUTO: 1.1 % (ref 0–1.5)
DEPRECATED RDW RBC AUTO: 37.9 FL (ref 37–54)
EOSINOPHIL # BLD AUTO: 0.59 10*3/MM3 (ref 0–0.4)
EOSINOPHIL NFR BLD AUTO: 6.4 % (ref 0.3–6.2)
ERYTHROCYTE [DISTWIDTH] IN BLOOD BY AUTOMATED COUNT: 17.4 % (ref 12.3–15.4)
FERRITIN SERPL-MCNC: 42.1 NG/ML (ref 13–150)
HCT VFR BLD AUTO: 31 % (ref 34–46.6)
HGB BLD-MCNC: 9.8 G/DL (ref 12–15.9)
HGB RETIC QN AUTO: 20.5 PG (ref 29.8–36.1)
HYPOCHROMIA BLD QL: NORMAL
IMM GRANULOCYTES # BLD AUTO: 0.05 10*3/MM3 (ref 0–0.05)
IMM GRANULOCYTES NFR BLD AUTO: 0.5 % (ref 0–0.5)
IMM RETICS NFR: 19.9 % (ref 3–15.8)
IRON 24H UR-MRATE: 63 MCG/DL (ref 37–145)
IRON SATN MFR SERPL: 19 % (ref 20–50)
LYMPHOCYTES # BLD AUTO: 1.79 10*3/MM3 (ref 0.7–3.1)
LYMPHOCYTES NFR BLD AUTO: 19.4 % (ref 19.6–45.3)
MCH RBC QN AUTO: 20 PG (ref 26.6–33)
MCHC RBC AUTO-ENTMCNC: 31.6 G/DL (ref 31.5–35.7)
MCV RBC AUTO: 63.1 FL (ref 79–97)
MICROCYTES BLD QL: NORMAL
MONOCYTES # BLD AUTO: 0.53 10*3/MM3 (ref 0.1–0.9)
MONOCYTES NFR BLD AUTO: 5.8 % (ref 5–12)
NEUTROPHILS NFR BLD AUTO: 6.15 10*3/MM3 (ref 1.7–7)
NEUTROPHILS NFR BLD AUTO: 66.8 % (ref 42.7–76)
NRBC BLD AUTO-RTO: 0 /100 WBC (ref 0–0.2)
PLAT MORPH BLD: NORMAL
PLATELET # BLD AUTO: 1092 10*3/MM3 (ref 140–450)
PMV BLD AUTO: 9.7 FL (ref 6–12)
RBC # BLD AUTO: 4.91 10*6/MM3 (ref 3.77–5.28)
RETICS # AUTO: 0.14 10*6/MM3 (ref 0.02–0.13)
RETICS/RBC NFR AUTO: 2.86 % (ref 0.7–1.9)
TIBC SERPL-MCNC: 331 MCG/DL (ref 298–536)
TRANSFERRIN SERPL-MCNC: 222 MG/DL (ref 200–360)
WBC MORPH BLD: NORMAL
WBC NRBC COR # BLD: 9.21 10*3/MM3 (ref 3.4–10.8)

## 2022-08-26 PROCEDURE — 84466 ASSAY OF TRANSFERRIN: CPT | Performed by: INTERNAL MEDICINE

## 2022-08-26 PROCEDURE — 85007 BL SMEAR W/DIFF WBC COUNT: CPT | Performed by: INTERNAL MEDICINE

## 2022-08-26 PROCEDURE — 36415 COLL VENOUS BLD VENIPUNCTURE: CPT

## 2022-08-26 PROCEDURE — 85046 RETICYTE/HGB CONCENTRATE: CPT | Performed by: INTERNAL MEDICINE

## 2022-08-26 PROCEDURE — 99214 OFFICE O/P EST MOD 30 MIN: CPT | Performed by: INTERNAL MEDICINE

## 2022-08-26 PROCEDURE — 83540 ASSAY OF IRON: CPT | Performed by: INTERNAL MEDICINE

## 2022-08-26 PROCEDURE — 82728 ASSAY OF FERRITIN: CPT | Performed by: INTERNAL MEDICINE

## 2022-08-26 PROCEDURE — 85025 COMPLETE CBC W/AUTO DIFF WBC: CPT | Performed by: INTERNAL MEDICINE

## 2022-08-26 RX ORDER — OMEPRAZOLE 40 MG/1
40 CAPSULE, DELAYED RELEASE ORAL
COMMUNITY
Start: 2022-04-22 | End: 2022-08-26

## 2022-08-26 RX ORDER — PANTOPRAZOLE SODIUM 40 MG/1
40 TABLET, DELAYED RELEASE ORAL
COMMUNITY
Start: 2022-08-10

## 2023-01-11 NOTE — PROGRESS NOTES
"[facility] CBC GROUP OUTPATIENT FOLLOW UP VISIT    REASON FOR FOLLOW-UP:    1.  Beta thalassemia minor.  2.  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    HISTORY OF PRESENT ILLNESS:  Mar Hill is a 52 y.o. female who returns today for follow up of the above issue.     She overall has been feeling well.  Recent viral upper respiratory infection.  She recovered from this.  Menorrhagia persists.  She continues her iron supplement but admits to not taking it every day.  Over the past couple of days she had some mild gingival bleeding which has resolved.  No other bleeding.    REVIEW OF SYSTEMS:  As per the HPI    Vitals:    01/12/23 0932   BP: 130/79   Pulse: 75   Resp: 18   Temp: 97.3 °F (36.3 °C)   TempSrc: Temporal   SpO2: 97%   Weight: 84 kg (185 lb 1.6 oz)   Height: 165.1 cm (65\")   PainSc: 0-No pain       PHYSICAL EXAMINATION:  General:  No acute distress, awake, alert and oriented  Skin:  Warm and dry, no visible rash  HEENT:  Normocephalic/atraumatic.  Wearing a face mask.  Chest:  Normal respiratory effort  Extremities:  No visible clubbing, cyanosis, or edema  Neuro/psych:  Grossly nonfocal.  Normal mood and affect.      DIAGNOSTIC DATA:  CBC & Differential (01/12/2023 09:21)  Retic With IRF & RET-He (01/12/2023 09:21)      ASSESSMENT:  This is a 52 y.o. female with:    *Essential thrombocythemia  · Calreticulin mutation positive.    · She continues aspirin 81 mg daily  · I recently repeated von Willebrand's studies which showed an activity level of 48% and antigen level of 136% and therefore she did  continue aspirin at that time.  · 1/12/2023: Platelets significantly more elevated at 1.5 million, up from 1.09 million on 8/26    *Beta thalassemia trait typically with mild microcytic anemia, worsening related to iron deficiency likely related to menorrhagia:   · Hemoglobin was lower on 5/6/2022 and she was iron deficient at that time, hemoglobin 9.4, ferritin 10.9, iron 34 with a " percent iron saturation  · EGD and colonoscopy on 7/15/2022 were unremarkable  · She was started on ferrous gluconate  · 8/26/2022: Hemoglobin slightly improved at 9.8, MCV 63.1, ferritin 42.1, iron 63 with 19% iron saturation  · Menorrhagia is likely contributing  · 1/12/2023: Hemoglobin 10.4, up from 9.8.  Iron studies and ferritin pending.    *Mild leukocytosis with neutrophilia likely related to myeloproliferative disorder.   · 1/12/2023: White blood cell count mildly elevated at 12.6      PLAN:   1. Continue aspirin. Increase the dose to 162 mg daily.  2. Check von Willebrand studies today  3. Follow up pending iron studies from today and increase oral iron if needed  4. Follow-up with gynecology if needed regarding menorrhagia  5. We discussed the possibility of starting hydroxyurea today. We will follow up pending labs and start now if needed. Otherwise she prefers close follow up to see if the platelet count decreases.   6. Return in one month with a CBC for close follow up    Orders Placed This Encounter   Procedures   • Von Willebrand Panel     Order Specific Question:   Release to patient     Answer:   Routine Release   • CBC & Differential     Standing Status:   Future     Standing Expiration Date:   1/12/2024     Order Specific Question:   Manual Differential     Answer:   No

## 2023-01-12 ENCOUNTER — LAB (OUTPATIENT)
Dept: LAB | Facility: HOSPITAL | Age: 53
End: 2023-01-12
Payer: COMMERCIAL

## 2023-01-12 ENCOUNTER — OFFICE VISIT (OUTPATIENT)
Dept: ONCOLOGY | Facility: CLINIC | Age: 53
End: 2023-01-12
Payer: COMMERCIAL

## 2023-01-12 VITALS
HEIGHT: 65 IN | SYSTOLIC BLOOD PRESSURE: 130 MMHG | WEIGHT: 185.1 LBS | OXYGEN SATURATION: 97 % | HEART RATE: 75 BPM | RESPIRATION RATE: 18 BRPM | BODY MASS INDEX: 30.84 KG/M2 | DIASTOLIC BLOOD PRESSURE: 79 MMHG | TEMPERATURE: 97.3 F

## 2023-01-12 DIAGNOSIS — D47.3 ESSENTIAL THROMBOCYTHEMIA: ICD-10-CM

## 2023-01-12 DIAGNOSIS — D56.3 BETA THALASSEMIA MINOR: ICD-10-CM

## 2023-01-12 DIAGNOSIS — D47.3 ESSENTIAL THROMBOCYTHEMIA: Primary | ICD-10-CM

## 2023-01-12 LAB
BASOPHILS # BLD AUTO: 0.1 10*3/MM3 (ref 0–0.2)
BASOPHILS NFR BLD AUTO: 0.8 % (ref 0–1.5)
DEPRECATED RDW RBC AUTO: 36.7 FL (ref 37–54)
EOSINOPHIL # BLD AUTO: 0.54 10*3/MM3 (ref 0–0.4)
EOSINOPHIL NFR BLD AUTO: 4.3 % (ref 0.3–6.2)
ERYTHROCYTE [DISTWIDTH] IN BLOOD BY AUTOMATED COUNT: 16.6 % (ref 12.3–15.4)
FERRITIN SERPL-MCNC: 45.3 NG/ML (ref 11–207)
HCT VFR BLD AUTO: 34.1 % (ref 34–46.6)
HGB BLD-MCNC: 10.4 G/DL (ref 12–15.9)
HGB RETIC QN AUTO: 21.3 PG (ref 29.8–36.1)
IMM GRANULOCYTES # BLD AUTO: 0.12 10*3/MM3 (ref 0–0.05)
IMM GRANULOCYTES NFR BLD AUTO: 0.9 % (ref 0–0.5)
IMM RETICS NFR: 24 % (ref 3–15.8)
IRON 24H UR-MRATE: 98 MCG/DL (ref 37–145)
IRON SATN MFR SERPL: 30 % (ref 14–48)
LYMPHOCYTES # BLD AUTO: 1.89 10*3/MM3 (ref 0.7–3.1)
LYMPHOCYTES NFR BLD AUTO: 15 % (ref 19.6–45.3)
MCH RBC QN AUTO: 19.7 PG (ref 26.6–33)
MCHC RBC AUTO-ENTMCNC: 30.5 G/DL (ref 31.5–35.7)
MCV RBC AUTO: 64.7 FL (ref 79–97)
MONOCYTES # BLD AUTO: 0.78 10*3/MM3 (ref 0.1–0.9)
MONOCYTES NFR BLD AUTO: 6.2 % (ref 5–12)
NEUTROPHILS NFR BLD AUTO: 72.8 % (ref 42.7–76)
NEUTROPHILS NFR BLD AUTO: 9.21 10*3/MM3 (ref 1.7–7)
NRBC BLD AUTO-RTO: 0.2 /100 WBC (ref 0–0.2)
PLATELET # BLD AUTO: 1543 10*3/MM3 (ref 140–450)
PMV BLD AUTO: 9.2 FL (ref 6–12)
RBC # BLD AUTO: 5.27 10*6/MM3 (ref 3.77–5.28)
RETICS # AUTO: 0.15 10*6/MM3 (ref 0.02–0.13)
RETICS/RBC NFR AUTO: 2.78 % (ref 0.7–1.9)
TIBC SERPL-MCNC: 326 MCG/DL (ref 249–505)
TRANSFERRIN SERPL-MCNC: 233 MG/DL (ref 200–360)
WBC NRBC COR # BLD: 12.64 10*3/MM3 (ref 3.4–10.8)

## 2023-01-12 PROCEDURE — 99214 OFFICE O/P EST MOD 30 MIN: CPT | Performed by: INTERNAL MEDICINE

## 2023-01-12 PROCEDURE — 85025 COMPLETE CBC W/AUTO DIFF WBC: CPT

## 2023-01-12 PROCEDURE — 82728 ASSAY OF FERRITIN: CPT

## 2023-01-12 PROCEDURE — 84466 ASSAY OF TRANSFERRIN: CPT

## 2023-01-12 PROCEDURE — 83540 ASSAY OF IRON: CPT

## 2023-01-12 PROCEDURE — 36415 COLL VENOUS BLD VENIPUNCTURE: CPT

## 2023-01-12 PROCEDURE — 85046 RETICYTE/HGB CONCENTRATE: CPT

## 2023-01-16 LAB
FACT VIII ACT/NOR PPP: 142 % (ref 56–140)
PATH INTERP BLD-IMP: NORMAL
VWF AG ACT/NOR PPP IA: 144 % (ref 50–200)
VWF:RCO ACT/NOR PPP PL AGG: 67 % (ref 50–200)

## 2023-02-10 NOTE — PROGRESS NOTES
"[facility] CBC GROUP OUTPATIENT FOLLOW UP VISIT    REASON FOR FOLLOW-UP:    1.  Beta thalassemia minor.  2.  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    HISTORY OF PRESENT ILLNESS:  Mar Hill is a 52 y.o. female who returns today for follow up of the above issue.     She continues to feel well.  No new issues.  She continues aspirin, alternating 81 mg and 162 mg daily.    REVIEW OF SYSTEMS:  As per the HPI    Vitals:    02/13/23 0750   BP: 131/76   Pulse: 68   Resp: 18   Temp: 97.6 °F (36.4 °C)   TempSrc: Temporal   SpO2: 99%   Weight: 84.6 kg (186 lb 9.6 oz)   Height: 165.1 cm (65\")   PainSc: 0-No pain       PHYSICAL EXAMINATION:  General:  No acute distress, awake, alert and oriented  Skin:  Warm and dry, no visible rash  HEENT:  Normocephalic/atraumatic.  Wearing a face mask.  Chest:  Normal respiratory effort  Extremities:  No visible clubbing, cyanosis, or edema  Neuro/psych:  Grossly nonfocal.  Normal mood and affect.          DIAGNOSTIC DATA:  CBC & Differential (02/13/2023 07:30)      ASSESSMENT:  This is a 52 y.o. female with:    *Essential thrombocythemia  · Calreticulin mutation positive.    · She continues aspirin 81 mg daily  · I recently repeated von Willebrand's studies which showed an activity level of 48% and antigen level of 136% and therefore she did  continue aspirin at that time.  · 1/12/2023: Platelets significantly more elevated at 1.5 million, up from 1.09 million on 8/26. Von Willebrand studies normal. She preferred not to start hydroxyurea  · 2/13/2023: Platelets improved at 1.273 million    *Beta thalassemia trait typically with mild microcytic anemia, worsening related to iron deficiency likely related to menorrhagia:   · Hemoglobin was lower on 5/6/2022 and she was iron deficient at that time, hemoglobin 9.4, ferritin 10.9, iron 34 with a percent iron saturation  · EGD and colonoscopy on 7/15/2022 were unremarkable  · She was started on ferrous " gluconate  · 8/26/2022: Hemoglobin slightly improved at 9.8, MCV 63.1, ferritin 42.1, iron 63 with 19% iron saturation  · Menorrhagia is likely contributing  · Hemoglobin 10.0    *Mild leukocytosis with neutrophilia likely related to myeloproliferative disorder.   · White blood cell count stable at 12.62      PLAN:   1. Continue aspirin.  She is alternating 81 mg and 162 mg every other day  2. Because her platelets have improved we will not start hydroxyurea  3. Follow-up in about 3 months with a CBC    No orders of the defined types were placed in this encounter.

## 2023-02-13 ENCOUNTER — OFFICE VISIT (OUTPATIENT)
Dept: ONCOLOGY | Facility: CLINIC | Age: 53
End: 2023-02-13
Payer: COMMERCIAL

## 2023-02-13 ENCOUNTER — LAB (OUTPATIENT)
Dept: LAB | Facility: HOSPITAL | Age: 53
End: 2023-02-13
Payer: COMMERCIAL

## 2023-02-13 VITALS
RESPIRATION RATE: 18 BRPM | DIASTOLIC BLOOD PRESSURE: 76 MMHG | OXYGEN SATURATION: 99 % | SYSTOLIC BLOOD PRESSURE: 131 MMHG | WEIGHT: 186.6 LBS | HEART RATE: 68 BPM | BODY MASS INDEX: 31.09 KG/M2 | HEIGHT: 65 IN | TEMPERATURE: 97.6 F

## 2023-02-13 DIAGNOSIS — D47.3 ESSENTIAL THROMBOCYTHEMIA: ICD-10-CM

## 2023-02-13 DIAGNOSIS — D56.3 BETA THALASSEMIA MINOR: ICD-10-CM

## 2023-02-13 DIAGNOSIS — D47.3 ESSENTIAL THROMBOCYTHEMIA: Primary | ICD-10-CM

## 2023-02-13 LAB
BASOPHILS # BLD AUTO: 0.15 10*3/MM3 (ref 0–0.2)
BASOPHILS NFR BLD AUTO: 1.2 % (ref 0–1.5)
DEPRECATED RDW RBC AUTO: 35.6 FL (ref 37–54)
EOSINOPHIL # BLD AUTO: 0.56 10*3/MM3 (ref 0–0.4)
EOSINOPHIL NFR BLD AUTO: 4.4 % (ref 0.3–6.2)
ERYTHROCYTE [DISTWIDTH] IN BLOOD BY AUTOMATED COUNT: 16.8 % (ref 12.3–15.4)
HCT VFR BLD AUTO: 31.6 % (ref 34–46.6)
HGB BLD-MCNC: 10 G/DL (ref 12–15.9)
IMM GRANULOCYTES # BLD AUTO: 0.2 10*3/MM3 (ref 0–0.05)
IMM GRANULOCYTES NFR BLD AUTO: 1.6 % (ref 0–0.5)
LYMPHOCYTES # BLD AUTO: 2.29 10*3/MM3 (ref 0.7–3.1)
LYMPHOCYTES NFR BLD AUTO: 18.1 % (ref 19.6–45.3)
MCH RBC QN AUTO: 20.2 PG (ref 26.6–33)
MCHC RBC AUTO-ENTMCNC: 31.6 G/DL (ref 31.5–35.7)
MCV RBC AUTO: 63.8 FL (ref 79–97)
MONOCYTES # BLD AUTO: 0.77 10*3/MM3 (ref 0.1–0.9)
MONOCYTES NFR BLD AUTO: 6.1 % (ref 5–12)
NEUTROPHILS NFR BLD AUTO: 68.6 % (ref 42.7–76)
NEUTROPHILS NFR BLD AUTO: 8.65 10*3/MM3 (ref 1.7–7)
NRBC BLD AUTO-RTO: 0.3 /100 WBC (ref 0–0.2)
PLATELET # BLD AUTO: 1273 10*3/MM3 (ref 140–450)
PMV BLD AUTO: 9.8 FL (ref 6–12)
RBC # BLD AUTO: 4.95 10*6/MM3 (ref 3.77–5.28)
WBC NRBC COR # BLD: 12.62 10*3/MM3 (ref 3.4–10.8)

## 2023-02-13 PROCEDURE — 36415 COLL VENOUS BLD VENIPUNCTURE: CPT

## 2023-02-13 PROCEDURE — 99213 OFFICE O/P EST LOW 20 MIN: CPT | Performed by: INTERNAL MEDICINE

## 2023-02-13 PROCEDURE — 85025 COMPLETE CBC W/AUTO DIFF WBC: CPT

## 2023-05-01 ENCOUNTER — TELEPHONE (OUTPATIENT)
Dept: ONCOLOGY | Facility: CLINIC | Age: 53
End: 2023-05-01
Payer: COMMERCIAL

## 2023-05-01 NOTE — TELEPHONE ENCOUNTER
"Caller: Mar Hill \"LISETTE\"    Relationship to patient: Self    Best call back number: [phone]    Patient is needing: TO F/U ABOUT HER HOSPITALIZATION LAST WEEK. SHE DOES HAVE A REGULAR F/U APPT COMING UP ON 5-8-23. SHE IS NOT SURE IF SHE NEEDS A HOSPITAL F/U APPT OR KEEP THE ONE ON 5-8-23.  "

## 2023-05-01 NOTE — PROGRESS NOTES
Call today from Dr. Mickey Dunlap from [facility]. 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 Platelets improved while there, and the menorrhagia resolved. Hgb dropped to 7.2 and plts were 784,000 at d/c, down from 8.4 and 1,096,000 at admission. Von Willebrand's studies obtained and pending.

## 2023-05-01 NOTE — TELEPHONE ENCOUNTER
LVM for pt advising her to call us or report to ED if she has bleeding prior to her appt on 5/8. Phone number provided for return call.

## 2023-05-05 NOTE — PROGRESS NOTES
"[facility] CBC GROUP OUTPATIENT FOLLOW UP VISIT    REASON FOR FOLLOW-UP:    1.  Beta thalassemia minor.  2.  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    HISTORY OF PRESENT ILLNESS:  Mar Hill is a 53 y.o. female who returns today for follow up of the above issue.     Recent admission in [place] with menorrhagia. Platelets were 1.037M, dropping to 784,000 at discharge on 4/27. Hgb was 7.2. Ristocetin cofactor was 54% (normal %). Ferrritin low normal at 16 with iron 56 and 17% iron saturation. B12 was 276.      Hgb was 10.6 and plts 1.079M several days ago at [facility].     She is on progesterone.  Menstrual bleeding has stopped.  She is following up with OB/GYN.  Dr. Gutierrez her gynecologist.  D&C is planned and then she is considering the possibility of an IUD or endometrial ablation.    REVIEW OF SYSTEMS:  As per the HPI    Vitals:    05/08/23 1017   BP: 129/72   Pulse: 75   Resp: 18   Temp: 97.8 °F (36.6 °C)   TempSrc: Temporal   SpO2: 99%   Weight: 82.4 kg (181 lb 11.2 oz)   Height: 165.1 cm (65\")   PainSc: 0-No pain       PHYSICAL EXAMINATION:  General:  No acute distress, awake, alert and oriented  Skin:  Warm and dry, no visible rash  HEENT:  Normocephalic/atraumatic.  Wearing a face mask.  Chest:  Normal respiratory effort  Extremities:  No visible clubbing, cyanosis, or edema  Neuro/psych:  Grossly nonfocal.  Normal mood and affect.    DIAGNOSTIC DATA:  CBC & Differential (05/08/2023 09:55)      ASSESSMENT:  This is a 53 y.o. female with:    *Essential thrombocythemia  · Calreticulin mutation positive.    · She continues aspirin 81 mg daily  · I recently repeated von Willebrand's studies which showed an activity level of 48% and antigen level of 136% and therefore she did  continue aspirin at that time.  · 1/12/2023: Platelets significantly more elevated at 1.5 million, up from 1.09 million on 8/26. Von Willebrand studies normal. She preferred not to start " hydroxyurea  · 2/13/2023: Platelets improved at 1.273 million  · 5/8/2023: Platelets improved at 859,000    *Beta thalassemia trait typically with mild microcytic anemia, worsening related to iron deficiency likely related to menorrhagia:   · Hemoglobin was lower on 5/6/2022 and she was iron deficient at that time, hemoglobin 9.4, ferritin 10.9, iron 34 with a percent iron saturation  · EGD and colonoscopy on 7/15/2022 were unremarkable  · She was started on ferrous gluconate  · 8/26/2022: Hemoglobin slightly improved at 9.8, MCV 63.1, ferritin 42.1, iron 63 with 19% iron saturation  · Menorrhagia is likely contributing  · She continues on iron supplement once daily.  She tolerates this well.  · Hemoglobin 10.4.    *Mild leukocytosis with neutrophilia likely related to myeloproliferative disorder.   · White blood cell count normal      PLAN:   1. Continue aspirin 81 mg daily  2. Continue ferrous gluconate once daily  3. Because her platelets have improved we will not start hydroxyurea at this time  4. Non-hormonal ways to manage the menorrhagia are preferred over hormonal.  Therefore, she will consider endometrial ablation after the D&C assuming that looks okay.  5. Follow-up in about 2 months with labs    Orders Placed This Encounter   Procedures   • Ferritin     Standing Status:   Future     Standing Expiration Date:   5/8/2024   • Iron Profile     Standing Status:   Future     Standing Expiration Date:   5/8/2024   • Retic With IRF & RET-He     Standing Status:   Future     Standing Expiration Date:   5/8/2024     Order Specific Question:   Release to patient     Answer:   Routine Release   • CBC & Differential     Standing Status:   Future     Standing Expiration Date:   5/7/2024     Order Specific Question:   Manual Differential     Answer:   No

## 2023-05-08 ENCOUNTER — OFFICE VISIT (OUTPATIENT)
Dept: ONCOLOGY | Facility: CLINIC | Age: 53
End: 2023-05-08
Payer: COMMERCIAL

## 2023-05-08 ENCOUNTER — LAB (OUTPATIENT)
Dept: LAB | Facility: HOSPITAL | Age: 53
End: 2023-05-08
Payer: COMMERCIAL

## 2023-05-08 VITALS
SYSTOLIC BLOOD PRESSURE: 129 MMHG | BODY MASS INDEX: 30.27 KG/M2 | WEIGHT: 181.7 LBS | HEIGHT: 65 IN | DIASTOLIC BLOOD PRESSURE: 72 MMHG | RESPIRATION RATE: 18 BRPM | HEART RATE: 75 BPM | TEMPERATURE: 97.8 F | OXYGEN SATURATION: 99 %

## 2023-05-08 DIAGNOSIS — D47.3 ESSENTIAL THROMBOCYTHEMIA: ICD-10-CM

## 2023-05-08 DIAGNOSIS — D47.3 ESSENTIAL THROMBOCYTHEMIA: Primary | ICD-10-CM

## 2023-05-08 LAB
BASOPHILS # BLD AUTO: 0.09 10*3/MM3 (ref 0–0.2)
BASOPHILS NFR BLD AUTO: 0.9 % (ref 0–1.5)
DEPRECATED RDW RBC AUTO: 53.5 FL (ref 37–54)
EOSINOPHIL # BLD AUTO: 0.44 10*3/MM3 (ref 0–0.4)
EOSINOPHIL NFR BLD AUTO: 4.2 % (ref 0.3–6.2)
ERYTHROCYTE [DISTWIDTH] IN BLOOD BY AUTOMATED COUNT: 22.1 % (ref 12.3–15.4)
HCT VFR BLD AUTO: 33.4 % (ref 34–46.6)
HGB BLD-MCNC: 10.4 G/DL (ref 12–15.9)
IMM GRANULOCYTES # BLD AUTO: 0.1 10*3/MM3 (ref 0–0.05)
IMM GRANULOCYTES NFR BLD AUTO: 0.9 % (ref 0–0.5)
LYMPHOCYTES # BLD AUTO: 1.93 10*3/MM3 (ref 0.7–3.1)
LYMPHOCYTES NFR BLD AUTO: 18.3 % (ref 19.6–45.3)
MCH RBC QN AUTO: 22.1 PG (ref 26.6–33)
MCHC RBC AUTO-ENTMCNC: 31.1 G/DL (ref 31.5–35.7)
MCV RBC AUTO: 70.9 FL (ref 79–97)
MONOCYTES # BLD AUTO: 0.64 10*3/MM3 (ref 0.1–0.9)
MONOCYTES NFR BLD AUTO: 6.1 % (ref 5–12)
NEUTROPHILS NFR BLD AUTO: 69.6 % (ref 42.7–76)
NEUTROPHILS NFR BLD AUTO: 7.35 10*3/MM3 (ref 1.7–7)
NRBC BLD AUTO-RTO: 0 /100 WBC (ref 0–0.2)
PLATELET # BLD AUTO: 859 10*3/MM3 (ref 140–450)
PMV BLD AUTO: 10.2 FL (ref 6–12)
RBC # BLD AUTO: 4.71 10*6/MM3 (ref 3.77–5.28)
WBC NRBC COR # BLD: 10.55 10*3/MM3 (ref 3.4–10.8)

## 2023-05-08 PROCEDURE — 85025 COMPLETE CBC W/AUTO DIFF WBC: CPT

## 2023-05-08 PROCEDURE — 99214 OFFICE O/P EST MOD 30 MIN: CPT | Performed by: INTERNAL MEDICINE

## 2023-05-08 PROCEDURE — 36415 COLL VENOUS BLD VENIPUNCTURE: CPT

## 2023-05-08 RX ORDER — LANOLIN ALCOHOL/MO/W.PET/CERES
CREAM (GRAM) TOPICAL
COMMUNITY
Start: 2023-05-04 | End: 2023-05-08

## 2023-05-08 RX ORDER — EPINEPHRINE 0.3 MG/.3ML
INJECTION, SOLUTION INTRAMUSCULAR
COMMUNITY
Start: 2023-03-20

## 2023-05-08 RX ORDER — PROGESTERONE 200 MG/1
200 CAPSULE ORAL NIGHTLY
COMMUNITY
Start: 2023-05-04

## 2023-05-08 RX ORDER — DOXYCYCLINE HYCLATE 50 MG/1
1 CAPSULE, GELATIN COATED ORAL
COMMUNITY
Start: 2023-04-07

## 2023-05-08 RX ORDER — NYSTATIN AND TRIAMCINOLONE ACETONIDE 100000; 1 [USP'U]/G; MG/G
OINTMENT TOPICAL
COMMUNITY
Start: 2023-04-10

## 2023-05-08 RX ORDER — DOXYCYCLINE HYCLATE 50 MG/1
CAPSULE, GELATIN COATED ORAL
Qty: 60 TABLET | Refills: 5 | Status: SHIPPED | OUTPATIENT
Start: 2023-05-08 | End: 2023-05-08

## 2023-07-31 ENCOUNTER — OFFICE VISIT (OUTPATIENT)
Dept: ONCOLOGY | Facility: CLINIC | Age: 53
End: 2023-07-31
Payer: COMMERCIAL

## 2023-07-31 ENCOUNTER — LAB (OUTPATIENT)
Dept: LAB | Facility: HOSPITAL | Age: 53
End: 2023-07-31
Payer: COMMERCIAL

## 2023-07-31 VITALS
BODY MASS INDEX: 29.87 KG/M2 | DIASTOLIC BLOOD PRESSURE: 76 MMHG | RESPIRATION RATE: 18 BRPM | SYSTOLIC BLOOD PRESSURE: 134 MMHG | HEIGHT: 65 IN | TEMPERATURE: 97.7 F | OXYGEN SATURATION: 100 % | WEIGHT: 179.3 LBS | HEART RATE: 86 BPM

## 2023-07-31 DIAGNOSIS — D56.3 BETA THALASSEMIA MINOR: ICD-10-CM

## 2023-07-31 DIAGNOSIS — D47.3 ESSENTIAL THROMBOCYTHEMIA: Primary | ICD-10-CM

## 2023-07-31 DIAGNOSIS — D47.3 ESSENTIAL THROMBOCYTHEMIA: ICD-10-CM

## 2023-07-31 LAB
BASOPHILS # BLD AUTO: 0.08 10*3/MM3 (ref 0–0.2)
BASOPHILS NFR BLD AUTO: 1 % (ref 0–1.5)
DEPRECATED RDW RBC AUTO: 35.9 FL (ref 37–54)
EOSINOPHIL # BLD AUTO: 0.59 10*3/MM3 (ref 0–0.4)
EOSINOPHIL NFR BLD AUTO: 7.3 % (ref 0.3–6.2)
ERYTHROCYTE [DISTWIDTH] IN BLOOD BY AUTOMATED COUNT: 17 % (ref 12.3–15.4)
HCT VFR BLD AUTO: 32 % (ref 34–46.6)
HGB BLD-MCNC: 10.1 G/DL (ref 12–15.9)
IMM GRANULOCYTES # BLD AUTO: 0.08 10*3/MM3 (ref 0–0.05)
IMM GRANULOCYTES NFR BLD AUTO: 1 % (ref 0–0.5)
LYMPHOCYTES # BLD AUTO: 2.04 10*3/MM3 (ref 0.7–3.1)
LYMPHOCYTES NFR BLD AUTO: 25.3 % (ref 19.6–45.3)
MCH RBC QN AUTO: 19.8 PG (ref 26.6–33)
MCHC RBC AUTO-ENTMCNC: 31.6 G/DL (ref 31.5–35.7)
MCV RBC AUTO: 62.9 FL (ref 79–97)
MONOCYTES # BLD AUTO: 0.45 10*3/MM3 (ref 0.1–0.9)
MONOCYTES NFR BLD AUTO: 5.6 % (ref 5–12)
NEUTROPHILS NFR BLD AUTO: 4.81 10*3/MM3 (ref 1.7–7)
NEUTROPHILS NFR BLD AUTO: 59.8 % (ref 42.7–76)
NRBC BLD AUTO-RTO: 0 /100 WBC (ref 0–0.2)
PLATELET # BLD AUTO: 1102 10*3/MM3 (ref 140–450)
PMV BLD AUTO: 10.1 FL (ref 6–12)
RBC # BLD AUTO: 5.09 10*6/MM3 (ref 3.77–5.28)
WBC NRBC COR # BLD: 8.05 10*3/MM3 (ref 3.4–10.8)

## 2023-07-31 PROCEDURE — 99213 OFFICE O/P EST LOW 20 MIN: CPT | Performed by: INTERNAL MEDICINE

## 2023-07-31 PROCEDURE — 85025 COMPLETE CBC W/AUTO DIFF WBC: CPT

## 2023-07-31 PROCEDURE — 36415 COLL VENOUS BLD VENIPUNCTURE: CPT

## 2023-09-11 ENCOUNTER — APPOINTMENT (OUTPATIENT)
Dept: WOMENS IMAGING | Facility: HOSPITAL | Age: 53
End: 2023-09-11
Payer: COMMERCIAL

## 2023-09-11 PROCEDURE — 77061 BREAST TOMOSYNTHESIS UNI: CPT | Performed by: RADIOLOGY

## 2023-09-11 PROCEDURE — 77065 DX MAMMO INCL CAD UNI: CPT | Performed by: RADIOLOGY

## 2023-09-11 PROCEDURE — 76642 ULTRASOUND BREAST LIMITED: CPT | Performed by: RADIOLOGY

## 2023-09-11 PROCEDURE — G0279 TOMOSYNTHESIS, MAMMO: HCPCS | Performed by: RADIOLOGY

## 2023-09-22 ENCOUNTER — APPOINTMENT (OUTPATIENT)
Dept: WOMENS IMAGING | Facility: HOSPITAL | Age: 53
End: 2023-09-22
Payer: COMMERCIAL

## 2023-09-22 PROCEDURE — 76942 ECHO GUIDE FOR BIOPSY: CPT | Performed by: RADIOLOGY

## 2023-09-22 PROCEDURE — 19000 PUNCTURE ASPIR CYST BREAST: CPT | Performed by: RADIOLOGY

## 2023-10-20 NOTE — PROGRESS NOTES
"[facility] CBC GROUP OUTPATIENT FOLLOW UP VISIT    REASON FOR FOLLOW-UP:    1.  Beta thalassemia minor.  2.  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    HISTORY OF PRESENT ILLNESS:  Mar Hill is a 53 y.o. female who returns today for follow up of the above issue.     She feels very well.  No bleeding.  No headaches or vision changes.  Energy level is excellent.    REVIEW OF SYSTEMS:  As per the HPI    Vitals:    10/23/23 1609   BP: 125/78   Pulse: 76   Resp: 18   Temp: 96.9 °F (36.1 °C)   TempSrc: Temporal   SpO2: 100%   Weight: 80.9 kg (178 lb 4.8 oz)   Height: 165.1 cm (65\")   PainSc: 0-No pain       PHYSICAL EXAMINATION:  General:  No acute distress, awake, alert and oriented  Skin:  Warm and dry, no visible rash  HEENT:  Normocephalic/atraumatic.  Chest:  Normal respiratory effort  Extremities:  No visible clubbing, cyanosis, or edema  Neuro/psych:  Grossly nonfocal.  Normal mood and affect.    Examined today.  No change from prior noted.    DIAGNOSTIC DATA:  CBC & Differential (10/23/2023 15:57)     ASSESSMENT:  This is a 53 y.o. female with:    *Essential thrombocythemia  Calreticulin mutation positive.    She continues aspirin 81 mg daily  I recently repeated von Willebrand's studies which showed an activity level of 48% and antigen level of 136% and therefore she did  continue aspirin at that time.  1/12/2023: Platelets significantly more elevated at 1.5 million, up from 1.09 million on 8/26. Von Willebrand studies normal. She preferred not to start hydroxyurea  2/13/2023: Platelets improved at 1.273 million  5/8/2023: Platelets improved at 859,000  7/3/2023: Platelets have increased significantly at 1.43 million  7/31/2023: Platelets improved at 1.102 million  10/23/2023: Platelets stable at 1.145 million    *Beta thalassemia trait typically with mild microcytic anemia, worsening related to iron deficiency likely related to menorrhagia:   Hemoglobin was lower on 5/6/2022 and she " was iron deficient at that time, hemoglobin 9.4, ferritin 10.9, iron 34 with a percent iron saturation  EGD and colonoscopy on 7/15/2022 were unremarkable  Menorrhagia is likely contributing  10/23/2023: Hemoglobin stable at 10.6, MCV 63    *Mild leukocytosis with neutrophilia likely related to myeloproliferative disorder.   White blood cell count remains normal at 10.63    *Recent COVID-19 infection around 6/15/2023  This may be the reason for the acute increase in her platelet count.  Indeed, platelets have improved.    PLAN:   Continue aspirin 81 mg daily.  No need for hydroxyurea at this time.  I will see her back in January with a CBC and reticulocyte count    Orders Placed This Encounter   Procedures    Retic With IRF & RET-He     Standing Status:   Future     Standing Expiration Date:   10/23/2024     Order Specific Question:   Release to patient     Answer:   Routine Release [[id number]]    CBC & Differential     Standing Status:   Future     Standing Expiration Date:   10/22/2024     Order Specific Question:   Manual Differential     Answer:   No     Order Specific Question:   Release to patient     Answer:   Routine Release [[id number]]

## 2023-10-23 ENCOUNTER — OFFICE VISIT (OUTPATIENT)
Dept: ONCOLOGY | Facility: CLINIC | Age: 53
End: 2023-10-23
Payer: COMMERCIAL

## 2023-10-23 ENCOUNTER — LAB (OUTPATIENT)
Dept: LAB | Facility: HOSPITAL | Age: 53
End: 2023-10-23
Payer: COMMERCIAL

## 2023-10-23 VITALS
BODY MASS INDEX: 29.71 KG/M2 | SYSTOLIC BLOOD PRESSURE: 125 MMHG | DIASTOLIC BLOOD PRESSURE: 78 MMHG | TEMPERATURE: 96.9 F | RESPIRATION RATE: 18 BRPM | WEIGHT: 178.3 LBS | HEIGHT: 65 IN | OXYGEN SATURATION: 100 % | HEART RATE: 76 BPM

## 2023-10-23 DIAGNOSIS — D56.3 BETA THALASSEMIA MINOR: ICD-10-CM

## 2023-10-23 DIAGNOSIS — D47.3 ESSENTIAL THROMBOCYTHEMIA: ICD-10-CM

## 2023-10-23 DIAGNOSIS — D47.3 ESSENTIAL THROMBOCYTHEMIA: Primary | ICD-10-CM

## 2023-10-23 LAB
BASOPHILS # BLD AUTO: 0.12 10*3/MM3 (ref 0–0.2)
BASOPHILS NFR BLD AUTO: 1.1 % (ref 0–1.5)
DEPRECATED RDW RBC AUTO: 34.9 FL (ref 37–54)
EOSINOPHIL # BLD AUTO: 0.54 10*3/MM3 (ref 0–0.4)
EOSINOPHIL NFR BLD AUTO: 5.1 % (ref 0.3–6.2)
ERYTHROCYTE [DISTWIDTH] IN BLOOD BY AUTOMATED COUNT: 16.1 % (ref 12.3–15.4)
HCT VFR BLD AUTO: 33.1 % (ref 34–46.6)
HGB BLD-MCNC: 10.6 G/DL (ref 12–15.9)
IMM GRANULOCYTES # BLD AUTO: 0.08 10*3/MM3 (ref 0–0.05)
IMM GRANULOCYTES NFR BLD AUTO: 0.8 % (ref 0–0.5)
LYMPHOCYTES # BLD AUTO: 2.55 10*3/MM3 (ref 0.7–3.1)
LYMPHOCYTES NFR BLD AUTO: 24 % (ref 19.6–45.3)
MCH RBC QN AUTO: 20.2 PG (ref 26.6–33)
MCHC RBC AUTO-ENTMCNC: 32 G/DL (ref 31.5–35.7)
MCV RBC AUTO: 63 FL (ref 79–97)
MONOCYTES # BLD AUTO: 0.63 10*3/MM3 (ref 0.1–0.9)
MONOCYTES NFR BLD AUTO: 5.9 % (ref 5–12)
NEUTROPHILS NFR BLD AUTO: 6.71 10*3/MM3 (ref 1.7–7)
NEUTROPHILS NFR BLD AUTO: 63.1 % (ref 42.7–76)
NRBC BLD AUTO-RTO: 0 /100 WBC (ref 0–0.2)
PLATELET # BLD AUTO: 1145 10*3/MM3 (ref 140–450)
PMV BLD AUTO: 9.9 FL (ref 6–12)
RBC # BLD AUTO: 5.25 10*6/MM3 (ref 3.77–5.28)
WBC NRBC COR # BLD: 10.63 10*3/MM3 (ref 3.4–10.8)

## 2023-10-23 PROCEDURE — 85025 COMPLETE CBC W/AUTO DIFF WBC: CPT

## 2023-10-23 PROCEDURE — 36415 COLL VENOUS BLD VENIPUNCTURE: CPT

## 2023-10-23 PROCEDURE — 99214 OFFICE O/P EST MOD 30 MIN: CPT | Performed by: INTERNAL MEDICINE

## 2023-10-23 RX ORDER — CETIRIZINE HYDROCHLORIDE 10 MG/1
10 TABLET ORAL DAILY
COMMUNITY

## 2024-01-22 ENCOUNTER — LAB (OUTPATIENT)
Dept: LAB | Facility: HOSPITAL | Age: 54
End: 2024-01-22
Payer: COMMERCIAL

## 2024-01-22 ENCOUNTER — OFFICE VISIT (OUTPATIENT)
Dept: ONCOLOGY | Facility: CLINIC | Age: 54
End: 2024-01-22
Payer: COMMERCIAL

## 2024-01-22 VITALS
DIASTOLIC BLOOD PRESSURE: 85 MMHG | SYSTOLIC BLOOD PRESSURE: 136 MMHG | OXYGEN SATURATION: 100 % | WEIGHT: 174.9 LBS | RESPIRATION RATE: 18 BRPM | TEMPERATURE: 96.2 F | HEART RATE: 73 BPM | BODY MASS INDEX: 29.14 KG/M2 | HEIGHT: 65 IN

## 2024-01-22 DIAGNOSIS — D56.3 BETA THALASSEMIA MINOR: ICD-10-CM

## 2024-01-22 DIAGNOSIS — D47.3 ESSENTIAL THROMBOCYTHEMIA: ICD-10-CM

## 2024-01-22 DIAGNOSIS — D47.3 ESSENTIAL THROMBOCYTHEMIA: Primary | ICD-10-CM

## 2024-01-22 LAB
BASOPHILS # BLD AUTO: 0.11 10*3/MM3 (ref 0–0.2)
BASOPHILS NFR BLD AUTO: 1.1 % (ref 0–1.5)
DEPRECATED RDW RBC AUTO: 39 FL (ref 37–54)
EOSINOPHIL # BLD AUTO: 0.33 10*3/MM3 (ref 0–0.4)
EOSINOPHIL NFR BLD AUTO: 3.2 % (ref 0.3–6.2)
ERYTHROCYTE [DISTWIDTH] IN BLOOD BY AUTOMATED COUNT: 18.4 % (ref 12.3–15.4)
HCT VFR BLD AUTO: 34.5 % (ref 34–46.6)
HGB BLD-MCNC: 11.1 G/DL (ref 12–15.9)
HGB RETIC QN AUTO: 22.5 PG (ref 29.8–36.1)
IMM GRANULOCYTES # BLD AUTO: 0.05 10*3/MM3 (ref 0–0.05)
IMM GRANULOCYTES NFR BLD AUTO: 0.5 % (ref 0–0.5)
IMM RETICS NFR: 15.8 % (ref 3–15.8)
LYMPHOCYTES # BLD AUTO: 2.04 10*3/MM3 (ref 0.7–3.1)
LYMPHOCYTES NFR BLD AUTO: 19.6 % (ref 19.6–45.3)
MCH RBC QN AUTO: 20.4 PG (ref 26.6–33)
MCHC RBC AUTO-ENTMCNC: 32.2 G/DL (ref 31.5–35.7)
MCV RBC AUTO: 63.5 FL (ref 79–97)
MONOCYTES # BLD AUTO: 0.56 10*3/MM3 (ref 0.1–0.9)
MONOCYTES NFR BLD AUTO: 5.4 % (ref 5–12)
NEUTROPHILS NFR BLD AUTO: 7.32 10*3/MM3 (ref 1.7–7)
NEUTROPHILS NFR BLD AUTO: 70.2 % (ref 42.7–76)
NRBC BLD AUTO-RTO: 0 /100 WBC (ref 0–0.2)
PLATELET # BLD AUTO: 1158 10*3/MM3 (ref 140–450)
PMV BLD AUTO: 9.8 FL (ref 6–12)
RBC # BLD AUTO: 5.43 10*6/MM3 (ref 3.77–5.28)
RETICS # AUTO: 0.14 10*6/MM3 (ref 0.02–0.13)
RETICS/RBC NFR AUTO: 2.65 % (ref 0.7–1.9)
WBC NRBC COR # BLD AUTO: 10.41 10*3/MM3 (ref 3.4–10.8)

## 2024-01-22 PROCEDURE — 85025 COMPLETE CBC W/AUTO DIFF WBC: CPT

## 2024-01-22 PROCEDURE — 36415 COLL VENOUS BLD VENIPUNCTURE: CPT

## 2024-01-22 PROCEDURE — 85046 RETICYTE/HGB CONCENTRATE: CPT

## 2024-01-22 PROCEDURE — 99214 OFFICE O/P EST MOD 30 MIN: CPT | Performed by: INTERNAL MEDICINE

## 2024-01-22 NOTE — PROGRESS NOTES
"[facility] CBC GROUP OUTPATIENT FOLLOW UP VISIT    REASON FOR FOLLOW-UP:    1.  Beta thalassemia minor.  2.  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    HISTORY OF PRESENT ILLNESS:  Mar Hill is a 53 y.o. female who returns today for follow up of the above issue.     She has had some stress but otherwise is feeling well.     REVIEW OF SYSTEMS:  As per the HPI    Vitals:    01/22/24 1626   BP: 136/85   Pulse: 73   Resp: 18   Temp: 96.2 °F (35.7 °C)   TempSrc: Temporal   SpO2: 100%   Weight: 79.3 kg (174 lb 14.4 oz)   Height: 165.1 cm (65\")   PainSc: 0-No pain         PHYSICAL EXAMINATION:  General:  No acute distress, awake, alert and oriented  Skin:  Warm and dry, no visible rash  HEENT:  Normocephalic/atraumatic.  Chest:  Normal respiratory effort  Extremities:  No visible clubbing, cyanosis, or edema  Neuro/psych:  Grossly nonfocal.  Normal mood and affect.      DIAGNOSTIC DATA:  CBC & Differential (01/22/2024 16:33)  Retic With IRF & RET-He (01/22/2024 16:33)    ASSESSMENT:  This is a 53 y.o. female with:    *Essential thrombocythemia  Calreticulin mutation positive.    She continues aspirin 81 mg daily  I recently repeated von Willebrand's studies which showed an activity level of 48% and antigen level of 136% and therefore she did  continue aspirin at that time.  1/12/2023: Platelets significantly more elevated at 1.5 million, up from 1.09 million on 8/26. Von Willebrand studies normal. She preferred not to start hydroxyurea  2/13/2023: Platelets improved at 1.273 million  5/8/2023: Platelets improved at 859,000  7/3/2023: Platelets have increased significantly at 1.43 million  7/31/2023: Platelets improved at 1.102 million  10/23/2023: Platelets stable at 1.145 million  1/22/24: platelets stable 1.158 million    *Beta thalassemia trait typically with mild microcytic anemia, worsening related to iron deficiency likely related to menorrhagia:   Hemoglobin was lower on 5/6/2022 and " she was iron deficient at that time, hemoglobin 9.4, ferritin 10.9, iron 34 with a percent iron saturation  EGD and colonoscopy on 7/15/2022 were unremarkable  Menorrhagia is likely contributing  10/23/2023: Hemoglobin stable at 10.6, MCV 63  1/22/24: hemoglobin 11.1    *Mild leukocytosis with neutrophilia likely related to myeloproliferative disorder.   White blood cell count remains normal at 10.41    PLAN:   Continue aspirin 81 mg daily.  Again, no need for hydroxyurea at this time.  I will see her back in 3-4 months with a CBC and reticulocyte count    Orders Placed This Encounter   Procedures    Retic With IRF & RET-He     Standing Status:   Future     Standing Expiration Date:   1/22/2025     Order Specific Question:   Release to patient     Answer:   Routine Release [[id number]]    CBC & Differential     Standing Status:   Future     Standing Expiration Date:   1/21/2025     Order Specific Question:   Manual Differential     Answer:   No     Order Specific Question:   Release to patient     Answer:   Routine Release [[id number]]

## 2024-04-26 ENCOUNTER — TELEPHONE (OUTPATIENT)
Dept: ONCOLOGY | Facility: CLINIC | Age: 54
End: 2024-04-26
Payer: COMMERCIAL

## 2024-04-26 NOTE — TELEPHONE ENCOUNTER
"    Caller: Mar Hill \"LISETTE\"    Relationship to patient: Self    Best call back number: [phone]    Type of visit: LAB & F/U 1    Requested date: MON OR FRI CALL TO R/S     If rescheduling, when is the original appointment: 4/22/2024  "

## 2024-06-03 ENCOUNTER — TELEPHONE (OUTPATIENT)
Dept: ONCOLOGY | Facility: CLINIC | Age: 54
End: 2024-06-03

## 2024-06-03 NOTE — TELEPHONE ENCOUNTER
"  Caller: Mar Hill \"LISETTE\"    Relationship to patient: Self    Best call back number: [phone]    Type of visit: LAB AND F/U APPT    Requested date: 6/10 LATER APPT TIME    If rescheduling, when is the original appointment: 6/10  "

## 2024-06-04 NOTE — PROGRESS NOTES
"[facility] CBC GROUP OUTPATIENT FOLLOW UP VISIT    REASON FOR FOLLOW-UP:    1.  Beta thalassemia minor.  2.  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    HISTORY OF PRESENT ILLNESS:  Mar Hill is a 54 y.o. female who returns today for follow up of the above issue.     She overall has been doing well.  She does at times feel lightheaded and unsteady on her feet.  She does have what sounds like orthostatic symptoms when going from sitting to standing or crouching to standing.  However, she does also note some milder symptoms when she is walking.  She has not had any syncope.  She is trying to drink more water and recognizes that she may drink too much coffee.  No respiratory symptoms.  No ear pain.  No vertigo.  No nausea or vomiting.  She continues aspirin 81 mg daily.    REVIEW OF SYSTEMS:  As per the HPI    Vitals:    06/10/24 0918   BP: 134/84   Pulse: 82   Temp: 98 °F (36.7 °C)   TempSrc: Oral   SpO2: 98%   Weight: 79.9 kg (176 lb 3.2 oz)   Height: 165.1 cm (65\")   PainSc: 0-No pain       PHYSICAL EXAMINATION:  General:  No acute distress, awake, alert and oriented  HEENT: Both auditory canals are open and both tympanic membranes have good light reflex.  Neck: No carotid bruits  Skin:  Warm and dry, no visible rash  HEENT:  Normocephalic/atraumatic.  Chest:  Normal respiratory effort  Extremities:  No visible clubbing, cyanosis, or edema  Neuro/psych:  Grossly nonfocal.  Normal mood and affect.      DIAGNOSTIC DATA:  Retic With IRF & RET-He (06/10/2024 09:08)  CBC & Differential (06/10/2024 09:08)    ASSESSMENT:  This is a 54 y.o. female with:    *Essential thrombocythemia  Calreticulin mutation positive.    She continues aspirin 81 mg daily  I recently repeated von Willebrand's studies which showed an activity level of 48% and antigen level of 136% and therefore she did  continue aspirin at that time.  1/12/2023: Platelets significantly more elevated at 1.5 million, up from 1.09 " million on 8/26. Von Willebrand studies normal. She preferred not to start hydroxyurea  2/13/2023: Platelets improved at 1.273 million  5/8/2023: Platelets improved at 859,000  7/3/2023: Platelets have increased significantly at 1.43 million  7/31/2023: Platelets improved at 1.102 million  10/23/2023: Platelets stable at 1.145 million  1/22/24: platelets stable 1.158 million  6/10/2024: Platelets 1.299 million, a little higher but overall stable    *Beta thalassemia trait typically with mild microcytic anemia, worsening related to iron deficiency likely related to menorrhagia:   Hemoglobin was lower on 5/6/2022 and she was iron deficient at that time, hemoglobin 9.4, ferritin 10.9, iron 34 with a percent iron saturation  EGD and colonoscopy on 7/15/2022 were unremarkable  Menorrhagia is likely contributing  10/23/2023: Hemoglobin stable at 10.6, MCV 63  6/10/2024: Hemoglobin stable at 10.6, MCV 63.1    *Mild leukocytosis with neutrophilia likely related to myeloproliferative disorder.   White blood cell count normal at 10.2    *Lightheadedness and unsteadiness on her feet  I do not think this is related to the thrombocytosis although this is possible  We discussed consuming more water and less caffeinated coffee    PLAN:   Continue aspirin 81 mg daily.  Consider increasing the dose to 325 mg at some point but I am not sure that her symptoms are due to thrombocytosis.  She has no carotid bruits.  Her ears look fine.  We discussed increasing water intake and limiting intake of caffeinated coffee to see if this helps with her symptoms which sound orthostatic and perhaps secondary to some volume depletion.  No hydroxyurea at this time  Follow-up in 3 to 4 months.  I encouraged her to notify us if her symptoms worsen.    Orders Placed This Encounter   Procedures    Retic With IRF & RET-He     Standing Status:   Future     Standing Expiration Date:   6/11/2025     Order Specific Question:   Release to patient     Answer:    Routine Release [[id number]]    CBC & Differential     Standing Status:   Future     Standing Expiration Date:   6/10/2025     Order Specific Question:   Manual Differential     Answer:   No     Order Specific Question:   Release to patient     Answer:   Routine Release [[id number]]     I spent 40 minutes in this visit today reviewing her record, communicating with her, examining her, placing orders, documenting the encounter.

## 2024-06-10 ENCOUNTER — OFFICE VISIT (OUTPATIENT)
Dept: ONCOLOGY | Facility: CLINIC | Age: 54
End: 2024-06-10
Payer: COMMERCIAL

## 2024-06-10 ENCOUNTER — LAB (OUTPATIENT)
Dept: LAB | Facility: HOSPITAL | Age: 54
End: 2024-06-10
Payer: COMMERCIAL

## 2024-06-10 VITALS
TEMPERATURE: 98 F | SYSTOLIC BLOOD PRESSURE: 134 MMHG | WEIGHT: 176.2 LBS | HEART RATE: 82 BPM | BODY MASS INDEX: 29.36 KG/M2 | OXYGEN SATURATION: 98 % | DIASTOLIC BLOOD PRESSURE: 84 MMHG | HEIGHT: 65 IN

## 2024-06-10 DIAGNOSIS — D47.3 ESSENTIAL THROMBOCYTHEMIA: Primary | ICD-10-CM

## 2024-06-10 DIAGNOSIS — D47.3 ESSENTIAL THROMBOCYTHEMIA: ICD-10-CM

## 2024-06-10 DIAGNOSIS — D56.3 BETA THALASSEMIA MINOR: ICD-10-CM

## 2024-06-10 LAB
BASOPHILS # BLD AUTO: 0.11 10*3/MM3 (ref 0–0.2)
BASOPHILS NFR BLD AUTO: 1.1 % (ref 0–1.5)
DACRYOCYTES BLD QL SMEAR: NORMAL
DEPRECATED RDW RBC AUTO: 35.8 FL (ref 37–54)
EOSINOPHIL # BLD AUTO: 0.64 10*3/MM3 (ref 0–0.4)
EOSINOPHIL NFR BLD AUTO: 6.3 % (ref 0.3–6.2)
ERYTHROCYTE [DISTWIDTH] IN BLOOD BY AUTOMATED COUNT: 17.1 % (ref 12.3–15.4)
HCT VFR BLD AUTO: 33.8 % (ref 34–46.6)
HGB BLD-MCNC: 10.6 G/DL (ref 12–15.9)
HGB RETIC QN AUTO: 21.3 PG (ref 29.8–36.1)
HYPOCHROMIA BLD QL: NORMAL
IMM GRANULOCYTES # BLD AUTO: 0.19 10*3/MM3 (ref 0–0.05)
IMM GRANULOCYTES NFR BLD AUTO: 1.9 % (ref 0–0.5)
IMM RETICS NFR: 18.1 % (ref 3–15.8)
LARGE PLATELETS: NORMAL
LYMPHOCYTES # BLD AUTO: 2.17 10*3/MM3 (ref 0.7–3.1)
LYMPHOCYTES NFR BLD AUTO: 21.3 % (ref 19.6–45.3)
MCH RBC QN AUTO: 19.8 PG (ref 26.6–33)
MCHC RBC AUTO-ENTMCNC: 31.4 G/DL (ref 31.5–35.7)
MCV RBC AUTO: 63.1 FL (ref 79–97)
MONOCYTES # BLD AUTO: 0.64 10*3/MM3 (ref 0.1–0.9)
MONOCYTES NFR BLD AUTO: 6.3 % (ref 5–12)
NEUTROPHILS NFR BLD AUTO: 6.46 10*3/MM3 (ref 1.7–7)
NEUTROPHILS NFR BLD AUTO: 63.1 % (ref 42.7–76)
OVALOCYTES BLD QL SMEAR: NORMAL
PLATELET # BLD AUTO: 1299 10*3/MM3 (ref 140–450)
PMV BLD AUTO: 9.6 FL (ref 6–12)
RBC # BLD AUTO: 5.36 10*6/MM3 (ref 3.77–5.28)
RETICS # AUTO: 0.13 10*6/MM3 (ref 0.02–0.13)
RETICS/RBC NFR AUTO: 2.42 % (ref 0.7–1.9)
WBC MORPH BLD: NORMAL
WBC NRBC COR # BLD AUTO: 10.21 10*3/MM3 (ref 3.4–10.8)

## 2024-06-10 PROCEDURE — 99215 OFFICE O/P EST HI 40 MIN: CPT | Performed by: INTERNAL MEDICINE

## 2024-06-10 PROCEDURE — 85025 COMPLETE CBC W/AUTO DIFF WBC: CPT

## 2024-06-10 PROCEDURE — 85046 RETICYTE/HGB CONCENTRATE: CPT | Performed by: INTERNAL MEDICINE

## 2024-06-10 PROCEDURE — 36415 COLL VENOUS BLD VENIPUNCTURE: CPT

## 2024-06-10 PROCEDURE — 85007 BL SMEAR W/DIFF WBC COUNT: CPT

## 2024-06-10 RX ORDER — TRAZODONE HYDROCHLORIDE 50 MG/1
50 TABLET ORAL NIGHTLY
COMMUNITY

## 2024-09-16 ENCOUNTER — LAB (OUTPATIENT)
Dept: LAB | Facility: HOSPITAL | Age: 54
End: 2024-09-16
Payer: COMMERCIAL

## 2024-09-16 ENCOUNTER — OFFICE VISIT (OUTPATIENT)
Dept: ONCOLOGY | Facility: CLINIC | Age: 54
End: 2024-09-16
Payer: COMMERCIAL

## 2024-09-16 VITALS
OXYGEN SATURATION: 99 % | RESPIRATION RATE: 16 BRPM | HEIGHT: 65 IN | TEMPERATURE: 98.3 F | DIASTOLIC BLOOD PRESSURE: 75 MMHG | SYSTOLIC BLOOD PRESSURE: 129 MMHG | HEART RATE: 70 BPM | WEIGHT: 179.7 LBS | BODY MASS INDEX: 29.94 KG/M2

## 2024-09-16 DIAGNOSIS — D56.3 BETA THALASSEMIA MINOR: ICD-10-CM

## 2024-09-16 DIAGNOSIS — D47.3 ESSENTIAL THROMBOCYTHEMIA: Primary | ICD-10-CM

## 2024-09-16 DIAGNOSIS — D47.3 ESSENTIAL THROMBOCYTHEMIA: ICD-10-CM

## 2024-09-16 LAB
BASOPHILS # BLD AUTO: 0.15 10*3/MM3 (ref 0–0.2)
BASOPHILS NFR BLD AUTO: 1.1 % (ref 0–1.5)
DEPRECATED RDW RBC AUTO: 37.2 FL (ref 37–54)
EOSINOPHIL # BLD AUTO: 0.75 10*3/MM3 (ref 0–0.4)
EOSINOPHIL NFR BLD AUTO: 5.4 % (ref 0.3–6.2)
ERYTHROCYTE [DISTWIDTH] IN BLOOD BY AUTOMATED COUNT: 17.3 % (ref 12.3–15.4)
HCT VFR BLD AUTO: 31.6 % (ref 34–46.6)
HGB BLD-MCNC: 9.9 G/DL (ref 12–15.9)
HGB RETIC QN AUTO: 20.9 PG (ref 29.8–36.1)
IMM GRANULOCYTES # BLD AUTO: 0.16 10*3/MM3 (ref 0–0.05)
IMM GRANULOCYTES NFR BLD AUTO: 1.2 % (ref 0–0.5)
IMM RETICS NFR: 28.8 % (ref 3–15.8)
LYMPHOCYTES # BLD AUTO: 2.25 10*3/MM3 (ref 0.7–3.1)
LYMPHOCYTES NFR BLD AUTO: 16.3 % (ref 19.6–45.3)
MCH RBC QN AUTO: 20.2 PG (ref 26.6–33)
MCHC RBC AUTO-ENTMCNC: 31.3 G/DL (ref 31.5–35.7)
MCV RBC AUTO: 64.6 FL (ref 79–97)
MONOCYTES # BLD AUTO: 0.8 10*3/MM3 (ref 0.1–0.9)
MONOCYTES NFR BLD AUTO: 5.8 % (ref 5–12)
NEUTROPHILS NFR BLD AUTO: 70.2 % (ref 42.7–76)
NEUTROPHILS NFR BLD AUTO: 9.66 10*3/MM3 (ref 1.7–7)
NRBC BLD AUTO-RTO: 0.2 /100 WBC (ref 0–0.2)
PLATELET # BLD AUTO: 1209 10*3/MM3 (ref 140–450)
PMV BLD AUTO: 9.5 FL (ref 6–12)
RBC # BLD AUTO: 4.89 10*6/MM3 (ref 3.77–5.28)
RETICS # AUTO: 0.16 10*6/MM3 (ref 0.02–0.13)
RETICS/RBC NFR AUTO: 3.33 % (ref 0.7–1.9)
WBC NRBC COR # BLD AUTO: 13.77 10*3/MM3 (ref 3.4–10.8)

## 2024-09-16 PROCEDURE — 85025 COMPLETE CBC W/AUTO DIFF WBC: CPT

## 2024-09-16 PROCEDURE — 85046 RETICYTE/HGB CONCENTRATE: CPT

## 2024-09-16 PROCEDURE — 99214 OFFICE O/P EST MOD 30 MIN: CPT | Performed by: INTERNAL MEDICINE

## 2024-09-16 PROCEDURE — 36415 COLL VENOUS BLD VENIPUNCTURE: CPT

## 2025-01-24 NOTE — PROGRESS NOTES
"[facility] CBC GROUP OUTPATIENT FOLLOW UP VISIT    REASON FOR FOLLOW-UP:    1.  Beta thalassemia minor.  2.  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    HISTORY OF PRESENT ILLNESS:  Mar Hill is a 54 y.o. female who returns today for follow up of the above issue.     She continues to feel well.  No new issues today aside from some left jaw discomfort which she believes may be related to clenching her teeth.  She does have a mouthguard that she is planning to use.    REVIEW OF SYSTEMS:  As per the HPI    Vitals:    01/27/25 1026   BP: 120/79   Pulse: 72   Resp: 16   Temp: 98.1 °F (36.7 °C)   TempSrc: Oral   SpO2: 94%   Weight: 80.3 kg (177 lb 1.6 oz)   Height: 165.1 cm (65\")   PainSc: 0-No pain       PHYSICAL EXAMINATION  General:  No acute distress, awake, alert and oriented  Skin:  Warm and dry, no visible rash  HEENT:  Normocephalic/atraumatic.  Auditory canals patent.  The left TM might be a little dull.  Chest:  Normal respiratory effort.  Lungs clear to auscultation bilaterally.  Heart: Regular rate and rhythm  Abdomen: Soft.  Liver and spleen not palpable.  Extremities:  No visible clubbing, cyanosis, or edema  Neuro/psych:  Grossly nonfocal.  Normal mood and affect.         DIAGNOSTIC DATA:  CBC & Differential (01/27/2025 10:11)  Retic With IRF & RET-He (01/27/2025 10:11)    ASSESSMENT:  This is a 54 y.o. female with:    *Essential thrombocythemia  Calreticulin mutation positive.    She continues aspirin 81 mg daily  I recently repeated von Willebrand's studies which showed an activity level of 48% and antigen level of 136% and therefore she did  continue aspirin at that time.  1/12/2023: Platelets significantly more elevated at 1.5 million, up from 1.09 million on 8/26. Von Willebrand studies normal. She preferred not to start hydroxyurea  2/13/2023: Platelets improved at 1.273 million  5/8/2023: Platelets improved at 859,000  7/3/2023: Platelets have increased significantly at " 1.43 million  7/31/2023: Platelets improved at 1.102 million  10/23/2023: Platelets stable at 1.145 million  1/22/24: platelets stable 1.158 million  6/10/2024: Platelets 1.299 million, a little higher but overall stable  9/16/2024: Platelets 1.209 million.  Overall stable.  1/27/2025: Platelets 1.490 million, hemoglobin 10.6, white blood cell count 11.52    *Beta thalassemia trait typically with mild microcytic anemia, worsening related to iron deficiency likely related to menorrhagia:   Hemoglobin was lower on 5/6/2022 and she was iron deficient at that time, hemoglobin 9.4, ferritin 10.9, iron 34 with a percent iron saturation  EGD and colonoscopy on 7/15/2022 were unremarkable  Menorrhagia is likely contributing  9/16/2024: Hemoglobin stable at 9.9, MCV 64.6  1/27/2025: Hemoglobin stable at 10.6    *Mild leukocytosis with neutrophilia likely related to myeloproliferative disorder.   White blood cell count stable at 11.5    PLAN:   She will continue aspirin and increase the dose a little bit from 81 mg daily  I will see her back in a couple of months for follow-up with a CBC.  If her platelet count remains near 1.5 million, we discussed that it may be necessary to start hydroxyurea.    Orders Placed This Encounter   Procedures    Retic With IRF & RET-He     Standing Status:   Future     Standing Expiration Date:   1/28/2026     Order Specific Question:   Release to patient     Answer:   Routine Release [[id number]]    CBC & Differential     Standing Status:   Future     Standing Expiration Date:   1/27/2026     Order Specific Question:   Manual Differential     Answer:   No     Order Specific Question:   Release to patient     Answer:   Routine Release [[id number]]

## 2025-01-27 ENCOUNTER — OFFICE VISIT (OUTPATIENT)
Dept: ONCOLOGY | Facility: CLINIC | Age: 55
End: 2025-01-27
Payer: COMMERCIAL

## 2025-01-27 ENCOUNTER — LAB (OUTPATIENT)
Dept: LAB | Facility: HOSPITAL | Age: 55
End: 2025-01-27
Payer: COMMERCIAL

## 2025-01-27 VITALS
HEIGHT: 65 IN | TEMPERATURE: 98.1 F | RESPIRATION RATE: 16 BRPM | DIASTOLIC BLOOD PRESSURE: 79 MMHG | HEART RATE: 72 BPM | BODY MASS INDEX: 29.51 KG/M2 | SYSTOLIC BLOOD PRESSURE: 120 MMHG | WEIGHT: 177.1 LBS | OXYGEN SATURATION: 94 %

## 2025-01-27 DIAGNOSIS — D47.3 ESSENTIAL THROMBOCYTHEMIA: Primary | ICD-10-CM

## 2025-01-27 DIAGNOSIS — D56.3 BETA THALASSEMIA MINOR: ICD-10-CM

## 2025-01-27 DIAGNOSIS — D47.3 ESSENTIAL THROMBOCYTHEMIA: ICD-10-CM

## 2025-01-27 LAB
BASOPHILS # BLD AUTO: 0.14 10*3/MM3 (ref 0–0.2)
BASOPHILS NFR BLD AUTO: 1.2 % (ref 0–1.5)
DEPRECATED RDW RBC AUTO: 36.9 FL (ref 37–54)
EOSINOPHIL # BLD AUTO: 0.7 10*3/MM3 (ref 0–0.4)
EOSINOPHIL NFR BLD AUTO: 6.1 % (ref 0.3–6.2)
ERYTHROCYTE [DISTWIDTH] IN BLOOD BY AUTOMATED COUNT: 17.2 % (ref 12.3–15.4)
HCT VFR BLD AUTO: 34.3 % (ref 34–46.6)
HGB BLD-MCNC: 10.6 G/DL (ref 12–15.9)
HGB RETIC QN AUTO: 21.5 PG (ref 29.8–36.1)
IMM GRANULOCYTES # BLD AUTO: 0.14 10*3/MM3 (ref 0–0.05)
IMM GRANULOCYTES NFR BLD AUTO: 1.2 % (ref 0–0.5)
IMM RETICS NFR: 19.2 % (ref 3–15.8)
LYMPHOCYTES # BLD AUTO: 2.19 10*3/MM3 (ref 0.7–3.1)
LYMPHOCYTES NFR BLD AUTO: 19 % (ref 19.6–45.3)
MCH RBC QN AUTO: 19.7 PG (ref 26.6–33)
MCHC RBC AUTO-ENTMCNC: 30.9 G/DL (ref 31.5–35.7)
MCV RBC AUTO: 63.6 FL (ref 79–97)
MONOCYTES # BLD AUTO: 0.72 10*3/MM3 (ref 0.1–0.9)
MONOCYTES NFR BLD AUTO: 6.3 % (ref 5–12)
NEUTROPHILS NFR BLD AUTO: 66.2 % (ref 42.7–76)
NEUTROPHILS NFR BLD AUTO: 7.63 10*3/MM3 (ref 1.7–7)
NRBC BLD AUTO-RTO: 0 /100 WBC (ref 0–0.2)
PLATELET # BLD AUTO: 1490 10*3/MM3 (ref 140–450)
PMV BLD AUTO: 9.8 FL (ref 6–12)
RBC # BLD AUTO: 5.39 10*6/MM3 (ref 3.77–5.28)
RETICS # AUTO: 0.14 10*6/MM3 (ref 0.02–0.13)
RETICS/RBC NFR AUTO: 2.52 % (ref 0.7–1.9)
WBC NRBC COR # BLD AUTO: 11.52 10*3/MM3 (ref 3.4–10.8)

## 2025-01-27 PROCEDURE — 85025 COMPLETE CBC W/AUTO DIFF WBC: CPT

## 2025-01-27 PROCEDURE — 99214 OFFICE O/P EST MOD 30 MIN: CPT | Performed by: INTERNAL MEDICINE

## 2025-01-27 PROCEDURE — 36415 COLL VENOUS BLD VENIPUNCTURE: CPT

## 2025-01-27 PROCEDURE — 85046 RETICYTE/HGB CONCENTRATE: CPT

## 2025-01-27 RX ORDER — FLUTICASONE PROPIONATE 50 MCG
1 SPRAY, SUSPENSION (ML) NASAL 2 TIMES DAILY
COMMUNITY
Start: 2025-01-13

## 2025-03-10 ENCOUNTER — TELEPHONE (OUTPATIENT)
Dept: ONCOLOGY | Facility: CLINIC | Age: 55
End: 2025-03-10

## 2025-03-14 NOTE — PROGRESS NOTES
"[facility] CBC GROUP OUTPATIENT FOLLOW UP VISIT    REASON FOR FOLLOW-UP:    1.  Beta thalassemia minor.  2.  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    HISTORY OF PRESENT ILLNESS:  Mar Hill is a 55 y.o. female who returns today for follow up of the above issue.     Patient returns to the office today, 3/17/2025 for 2-month follow-up and review. She continues on aspirin 81 mg daily.  She tolerates this without difficulty.  She has no signs or symptoms of bleeding. She reports intermittent dizziness with position changes. Admittedly she drinks a lot of coffee, rather than much water.       REVIEW OF SYSTEMS:  As per the HPI    Vitals:    03/17/25 0856   BP: 124/78   Pulse: 74   Resp: 16   Temp: 98.2 °F (36.8 °C)   TempSrc: Oral   SpO2: 96%   Weight: 80.8 kg (178 lb 3.2 oz)   Height: 165.1 cm (65\")   PainSc: 0-No pain       PHYSICAL EXAMINATION  General:  No acute distress, awake, alert and oriented  Skin:  Warm and dry, no visible rash  HEENT:  Normocephalic/atraumatic.  Auditory canals patent.  The left TM might be a little dull.  Chest:  Normal respiratory effort.  Lungs clear to auscultation bilaterally.  Heart: Regular rate and rhythm  Abdomen: Soft.  Liver and spleen not palpable.  Extremities:  No visible clubbing, cyanosis, or edema  Neuro/psych:  Grossly nonfocal.  Normal mood and affect.       DIAGNOSTIC DATA:  Retic With IRF & RET-He (03/17/2025 08:43)  CBC & Differential (03/17/2025 08:43)    ASSESSMENT:  This is a 55 y.o. female with:    *Essential thrombocythemia  Calreticulin mutation positive.    She continues aspirin 81 mg daily  I recently repeated von Willebrand's studies which showed an activity level of 48% and antigen level of 136% and therefore she did  continue aspirin at that time.  1/12/2023: Platelets significantly more elevated at 1.5 million, up from 1.09 million on 8/26. Von Willebrand studies normal. She preferred not to start hydroxyurea  2/13/2023: " Platelets improved at 1.273 million  5/8/2023: Platelets improved at 859,000  7/3/2023: Platelets have increased significantly at 1.43 million  7/31/2023: Platelets improved at 1.102 million  10/23/2023: Platelets stable at 1.145 million  1/22/24: platelets stable 1.158 million  6/10/2024: Platelets 1.299 million, a little higher but overall stable  9/16/2024: Platelets 1.209 million.  Overall stable.  1/27/2025: Platelets 1.490 million, hemoglobin 10.6, white blood cell count 11.52  3/17/2025 WBC 10.58, platelets 1.33 million.  Continues Aspirin, declines Hydrea    *Beta thalassemia trait typically with mild microcytic anemia, worsening related to iron deficiency likely related to menorrhagia:   Hemoglobin was lower on 5/6/2022 and she was iron deficient at that time, hemoglobin 9.4, ferritin 10.9, iron 34 with a percent iron saturation  EGD and colonoscopy on 7/15/2022 were unremarkable  Menorrhagia is likely contributing  9/16/2024: Hemoglobin stable at 9.9, MCV 64.6  1/27/2025: Hemoglobin stable at 10.6  3/17/2025 hemoglobin stable at 10.4    *Mild leukocytosis with neutrophilia likely related to myeloproliferative disorder.   White blood cell count improved today at 10.5      PLAN:   Patient continues on aspirin 81 mg daily  At this point with platelets declined slightly, the patient would like to avoid initiation of hydroxyurea  We discussed increasing oral hydration  Return in 8 to 10 weeks for CBC and MD follow-up with Dr. Robledo.  If her platelet count remains near 1.5 million, we discussed that it may be necessary to start hydroxyurea.    No orders of the defined types were placed in this encounter.

## 2025-03-17 ENCOUNTER — OFFICE VISIT (OUTPATIENT)
Dept: ONCOLOGY | Facility: CLINIC | Age: 55
End: 2025-03-17
Payer: COMMERCIAL

## 2025-03-17 ENCOUNTER — LAB (OUTPATIENT)
Dept: LAB | Facility: HOSPITAL | Age: 55
End: 2025-03-17
Payer: COMMERCIAL

## 2025-03-17 ENCOUNTER — APPOINTMENT (OUTPATIENT)
Dept: WOMENS IMAGING | Facility: HOSPITAL | Age: 55
End: 2025-03-17
Payer: COMMERCIAL

## 2025-03-17 VITALS
OXYGEN SATURATION: 96 % | HEART RATE: 74 BPM | TEMPERATURE: 98.2 F | WEIGHT: 178.2 LBS | SYSTOLIC BLOOD PRESSURE: 124 MMHG | HEIGHT: 65 IN | DIASTOLIC BLOOD PRESSURE: 78 MMHG | BODY MASS INDEX: 29.69 KG/M2 | RESPIRATION RATE: 16 BRPM

## 2025-03-17 DIAGNOSIS — D56.3 BETA THALASSEMIA MINOR: ICD-10-CM

## 2025-03-17 DIAGNOSIS — D47.3 ESSENTIAL THROMBOCYTHEMIA: Primary | ICD-10-CM

## 2025-03-17 DIAGNOSIS — D47.3 ESSENTIAL THROMBOCYTHEMIA: ICD-10-CM

## 2025-03-17 LAB
BASOPHILS # BLD AUTO: 0.12 10*3/MM3 (ref 0–0.2)
BASOPHILS NFR BLD AUTO: 1.1 % (ref 0–1.5)
DEPRECATED RDW RBC AUTO: 34.4 FL (ref 37–54)
EOSINOPHIL # BLD AUTO: 1.02 10*3/MM3 (ref 0–0.4)
EOSINOPHIL NFR BLD AUTO: 9.6 % (ref 0.3–6.2)
ERYTHROCYTE [DISTWIDTH] IN BLOOD BY AUTOMATED COUNT: 16.5 % (ref 12.3–15.4)
HCT VFR BLD AUTO: 33 % (ref 34–46.6)
HGB BLD-MCNC: 10.4 G/DL (ref 12–15.9)
HGB RETIC QN AUTO: 21.2 PG (ref 29.8–36.1)
IMM GRANULOCYTES # BLD AUTO: 0.13 10*3/MM3 (ref 0–0.05)
IMM GRANULOCYTES NFR BLD AUTO: 1.2 % (ref 0–0.5)
IMM RETICS NFR: 24.6 % (ref 3–15.8)
LYMPHOCYTES # BLD AUTO: 2.23 10*3/MM3 (ref 0.7–3.1)
LYMPHOCYTES NFR BLD AUTO: 21.1 % (ref 19.6–45.3)
MCH RBC QN AUTO: 19.7 PG (ref 26.6–33)
MCHC RBC AUTO-ENTMCNC: 31.5 G/DL (ref 31.5–35.7)
MCV RBC AUTO: 62.5 FL (ref 79–97)
MONOCYTES # BLD AUTO: 0.68 10*3/MM3 (ref 0.1–0.9)
MONOCYTES NFR BLD AUTO: 6.4 % (ref 5–12)
NEUTROPHILS NFR BLD AUTO: 6.41 10*3/MM3 (ref 1.7–7)
NEUTROPHILS NFR BLD AUTO: 60.6 % (ref 42.7–76)
NRBC BLD AUTO-RTO: 0 /100 WBC (ref 0–0.2)
PLATELET # BLD AUTO: 1331 10*3/MM3 (ref 140–450)
PMV BLD AUTO: 9.6 FL (ref 6–12)
RBC # BLD AUTO: 5.28 10*6/MM3 (ref 3.77–5.28)
RETICS # AUTO: 0.13 10*6/MM3 (ref 0.02–0.13)
RETICS/RBC NFR AUTO: 2.47 % (ref 0.7–1.9)
WBC NRBC COR # BLD AUTO: 10.59 10*3/MM3 (ref 3.4–10.8)

## 2025-03-17 PROCEDURE — 36415 COLL VENOUS BLD VENIPUNCTURE: CPT

## 2025-03-17 PROCEDURE — 85046 RETICYTE/HGB CONCENTRATE: CPT

## 2025-03-17 PROCEDURE — 85025 COMPLETE CBC W/AUTO DIFF WBC: CPT

## 2025-05-18 NOTE — PROGRESS NOTES
"[facility] CBC GROUP OUTPATIENT FOLLOW UP VISIT    REASON FOR FOLLOW-UP:    1.  Beta thalassemia minor.  2.  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    HISTORY OF PRESENT ILLNESS:  Mar Hill is a 55 y.o. female who returns today for follow up of the above issue.     Other than allergic rhinitis she is doing well.  She gets a monthly allergy shot.    REVIEW OF SYSTEMS:  As per the [facility]    Vitals:    05/19/25 0929   BP: 127/77   Pulse: 72   Resp: 16   Temp: 98.2 °F (36.8 °C)   TempSrc: Oral   SpO2: 98%   Weight: 85.1 kg (187 lb 9.6 oz)   Height: 165.1 cm (65\")   PainSc: 0-No pain         PHYSICAL EXAMINATION  General:  No acute distress, awake, alert and oriented  Skin:  Warm and dry, no visible rash  HEENT:  Normocephalic/atraumatic.   Chest:  Normal respiratory effort.  Lungs clear to auscultation bilaterally.  Heart: Regular rate and rhythm  Abdomen: Soft.  Liver and spleen not palpable again today  Extremities:  No visible clubbing, cyanosis, or edema  Neuro/psych:  Grossly nonfocal.  Normal mood and affect.       DIAGNOSTIC DATA:  CBC & Differential (05/19/2025 09:17)     ASSESSMENT:  This is a 55 y.o. female with:    *Essential thrombocythemia  Calreticulin mutation positive.    She continues aspirin 81 mg daily  I recently repeated von Willebrand's studies which showed an activity level of 48% and antigen level of 136% and therefore she did  continue aspirin at that time.  1/12/2023: Platelets significantly more elevated at 1.5 million, up from 1.09 million on 8/26. Von Willebrand studies normal. She preferred not to start hydroxyurea  2/13/2023: Platelets improved at 1.273 million  5/8/2023: Platelets improved at 859,000  7/3/2023: Platelets have increased significantly at 1.43 million  7/31/2023: Platelets improved at 1.102 million  10/23/2023: Platelets stable at 1.145 million  1/22/24: platelets stable 1.158 million  6/10/2024: Platelets 1.299 million, a little higher but " overall stable  9/16/2024: Platelets 1.209 million.  Overall stable.  1/27/2025: Platelets 1.490 million, hemoglobin 10.6, white blood cell count 11.52  3/17/2025 WBC 10.58, platelets 1.33 million.  Continues Aspirin, declines Hydrea  5/19/2025: Platelet count significantly improved at 1.173 million    *Beta thalassemia trait typically with mild microcytic anemia, worsening related to iron deficiency likely related to menorrhagia:   Hemoglobin was lower on 5/6/2022 and she was iron deficient at that time, hemoglobin 9.4, ferritin 10.9, iron 34 with a percent iron saturation  EGD and colonoscopy on 7/15/2022 were unremarkable  Menorrhagia is likely contributing  9/16/2024: Hemoglobin stable at 9.9, MCV 64.6  1/27/2025: Hemoglobin stable at 10.6  3/17/2025 hemoglobin stable at 10.4  5/19/2025: Hemoglobin stable at 10.4    *Mild leukocytosis with neutrophilia likely related to myeloproliferative disorder.   White blood cell count overall stable at 11.88      PLAN:   Continue aspirin 81 mg daily  No hydroxyurea necessary at this time with a nice decrease in her platelet count  Follow-up with her allergist for allergy shots  Follow-up in about 3 months with labs  If her platelet count increases to near 1.5 million, it may be necessary to start hydroxyurea but this is not necessary at this time.    Orders Placed This Encounter   Procedures    Retic With IRF & RET-He     Standing Status:   Future     Expected Date:   8/17/2025     Expiration Date:   5/20/2026     Release to patient:   Routine Release [[id number]]    CBC & Differential     Standing Status:   Future     Number of Occurrences:   1     Expected Date:   5/19/2025     Expiration Date:   5/16/2026     Release to patient:   Routine Release [[id number]]    CBC & Differential     Standing Status:   Future     Expected Date:   8/17/2025     Expiration Date:   5/19/2026     Manual Differential:   No     Release to patient:   Routine Release [[id number]]

## 2025-05-19 ENCOUNTER — OFFICE VISIT (OUTPATIENT)
Dept: ONCOLOGY | Facility: CLINIC | Age: 55
End: 2025-05-19
Payer: COMMERCIAL

## 2025-05-19 ENCOUNTER — LAB (OUTPATIENT)
Dept: LAB | Facility: HOSPITAL | Age: 55
End: 2025-05-19
Payer: COMMERCIAL

## 2025-05-19 VITALS
DIASTOLIC BLOOD PRESSURE: 77 MMHG | WEIGHT: 187.6 LBS | SYSTOLIC BLOOD PRESSURE: 127 MMHG | HEART RATE: 72 BPM | RESPIRATION RATE: 16 BRPM | TEMPERATURE: 98.2 F | OXYGEN SATURATION: 98 % | BODY MASS INDEX: 31.25 KG/M2 | HEIGHT: 65 IN

## 2025-05-19 DIAGNOSIS — D47.3 ESSENTIAL THROMBOCYTHEMIA: ICD-10-CM

## 2025-05-19 DIAGNOSIS — D47.3 ESSENTIAL THROMBOCYTHEMIA: Primary | ICD-10-CM

## 2025-05-19 LAB
BASOPHILS # BLD AUTO: 0.11 10*3/MM3 (ref 0–0.2)
BASOPHILS NFR BLD AUTO: 0.9 % (ref 0–1.5)
DEPRECATED RDW RBC AUTO: 36.8 FL (ref 37–54)
EOSINOPHIL # BLD AUTO: 0.96 10*3/MM3 (ref 0–0.4)
EOSINOPHIL NFR BLD AUTO: 8.1 % (ref 0.3–6.2)
ERYTHROCYTE [DISTWIDTH] IN BLOOD BY AUTOMATED COUNT: 17.7 % (ref 12.3–15.4)
HCT VFR BLD AUTO: 33.7 % (ref 34–46.6)
HGB BLD-MCNC: 10.4 G/DL (ref 12–15.9)
IMM GRANULOCYTES # BLD AUTO: 0.14 10*3/MM3 (ref 0–0.05)
IMM GRANULOCYTES NFR BLD AUTO: 1.2 % (ref 0–0.5)
LYMPHOCYTES # BLD AUTO: 2.06 10*3/MM3 (ref 0.7–3.1)
LYMPHOCYTES NFR BLD AUTO: 17.3 % (ref 19.6–45.3)
MCH RBC QN AUTO: 19.6 PG (ref 26.6–33)
MCHC RBC AUTO-ENTMCNC: 30.9 G/DL (ref 31.5–35.7)
MCV RBC AUTO: 63.5 FL (ref 79–97)
MONOCYTES # BLD AUTO: 0.76 10*3/MM3 (ref 0.1–0.9)
MONOCYTES NFR BLD AUTO: 6.4 % (ref 5–12)
NEUTROPHILS NFR BLD AUTO: 66.1 % (ref 42.7–76)
NEUTROPHILS NFR BLD AUTO: 7.85 10*3/MM3 (ref 1.7–7)
NRBC BLD AUTO-RTO: 0 /100 WBC (ref 0–0.2)
PLATELET # BLD AUTO: 1173 10*3/MM3 (ref 140–450)
PMV BLD AUTO: 9.9 FL (ref 6–12)
RBC # BLD AUTO: 5.31 10*6/MM3 (ref 3.77–5.28)
WBC NRBC COR # BLD AUTO: 11.88 10*3/MM3 (ref 3.4–10.8)

## 2025-05-19 PROCEDURE — 99214 OFFICE O/P EST MOD 30 MIN: CPT | Performed by: INTERNAL MEDICINE

## 2025-05-19 PROCEDURE — 36415 COLL VENOUS BLD VENIPUNCTURE: CPT

## 2025-05-19 PROCEDURE — 85025 COMPLETE CBC W/AUTO DIFF WBC: CPT

## 2025-08-18 ENCOUNTER — LAB (OUTPATIENT)
Dept: LAB | Facility: HOSPITAL | Age: 55
End: 2025-08-18
Payer: COMMERCIAL

## 2025-08-18 ENCOUNTER — OFFICE VISIT (OUTPATIENT)
Dept: ONCOLOGY | Facility: CLINIC | Age: 55
End: 2025-08-18
Payer: COMMERCIAL

## 2025-08-18 VITALS
WEIGHT: 183.7 LBS | TEMPERATURE: 98 F | OXYGEN SATURATION: 98 % | RESPIRATION RATE: 16 BRPM | HEART RATE: 70 BPM | DIASTOLIC BLOOD PRESSURE: 80 MMHG | HEIGHT: 65 IN | BODY MASS INDEX: 30.6 KG/M2 | SYSTOLIC BLOOD PRESSURE: 122 MMHG

## 2025-08-18 DIAGNOSIS — D47.3 ESSENTIAL THROMBOCYTHEMIA: Primary | ICD-10-CM

## 2025-08-18 DIAGNOSIS — D56.3 BETA THALASSEMIA MINOR: ICD-10-CM

## 2025-08-18 DIAGNOSIS — D47.3 ESSENTIAL THROMBOCYTHEMIA: ICD-10-CM

## 2025-08-18 LAB
BASOPHILS # BLD AUTO: 0.11 10*3/MM3 (ref 0–0.2)
BASOPHILS NFR BLD AUTO: 1 % (ref 0–1.5)
DEPRECATED RDW RBC AUTO: 35.7 FL (ref 37–54)
EOSINOPHIL # BLD AUTO: 0.76 10*3/MM3 (ref 0–0.4)
EOSINOPHIL NFR BLD AUTO: 7.2 % (ref 0.3–6.2)
ERYTHROCYTE [DISTWIDTH] IN BLOOD BY AUTOMATED COUNT: 17.2 % (ref 12.3–15.4)
HCT VFR BLD AUTO: 29.7 % (ref 34–46.6)
HGB BLD-MCNC: 9.4 G/DL (ref 12–15.9)
HGB RETIC QN AUTO: 21.9 PG (ref 29.8–36.1)
IMM GRANULOCYTES # BLD AUTO: 0.07 10*3/MM3 (ref 0–0.05)
IMM GRANULOCYTES NFR BLD AUTO: 0.7 % (ref 0–0.5)
IMM RETICS NFR: 20.2 % (ref 3–15.8)
LYMPHOCYTES # BLD AUTO: 2.34 10*3/MM3 (ref 0.7–3.1)
LYMPHOCYTES NFR BLD AUTO: 22.2 % (ref 19.6–45.3)
MCH RBC QN AUTO: 19.7 PG (ref 26.6–33)
MCHC RBC AUTO-ENTMCNC: 31.6 G/DL (ref 31.5–35.7)
MCV RBC AUTO: 62.4 FL (ref 79–97)
MONOCYTES # BLD AUTO: 0.68 10*3/MM3 (ref 0.1–0.9)
MONOCYTES NFR BLD AUTO: 6.4 % (ref 5–12)
NEUTROPHILS NFR BLD AUTO: 6.59 10*3/MM3 (ref 1.7–7)
NEUTROPHILS NFR BLD AUTO: 62.5 % (ref 42.7–76)
NRBC BLD AUTO-RTO: 0 /100 WBC (ref 0–0.2)
PLATELET # BLD AUTO: 1135 10*3/MM3 (ref 140–450)
PMV BLD AUTO: 9.6 FL (ref 6–12)
RBC # BLD AUTO: 4.76 10*6/MM3 (ref 3.77–5.28)
RETICS # AUTO: 0.15 10*6/MM3 (ref 0.02–0.13)
RETICS/RBC NFR AUTO: 3.06 % (ref 0.7–1.9)
WBC NRBC COR # BLD AUTO: 10.55 10*3/MM3 (ref 3.4–10.8)

## 2025-08-18 PROCEDURE — 36415 COLL VENOUS BLD VENIPUNCTURE: CPT

## 2025-08-18 PROCEDURE — 85046 RETICYTE/HGB CONCENTRATE: CPT

## 2025-08-18 PROCEDURE — 99214 OFFICE O/P EST MOD 30 MIN: CPT | Performed by: INTERNAL MEDICINE

## 2025-08-18 PROCEDURE — 85025 COMPLETE CBC W/AUTO DIFF WBC: CPT
